# Patient Record
Sex: FEMALE | Race: WHITE | NOT HISPANIC OR LATINO | Employment: OTHER | URBAN - METROPOLITAN AREA
[De-identification: names, ages, dates, MRNs, and addresses within clinical notes are randomized per-mention and may not be internally consistent; named-entity substitution may affect disease eponyms.]

---

## 2018-12-17 ENCOUNTER — OFFICE VISIT (OUTPATIENT)
Dept: SPINE | Facility: CLINIC | Age: 68
End: 2018-12-17
Payer: MEDICARE

## 2018-12-17 ENCOUNTER — HOSPITAL ENCOUNTER (OUTPATIENT)
Dept: RADIOLOGY | Facility: HOSPITAL | Age: 68
Discharge: HOME | End: 2018-12-17
Attending: PHYSICIAN ASSISTANT
Payer: MEDICARE

## 2018-12-17 VITALS
HEART RATE: 64 BPM | DIASTOLIC BLOOD PRESSURE: 81 MMHG | BODY MASS INDEX: 22.37 KG/M2 | HEIGHT: 61 IN | WEIGHT: 118.5 LBS | SYSTOLIC BLOOD PRESSURE: 149 MMHG

## 2018-12-17 DIAGNOSIS — M54.50 CHRONIC BILATERAL LOW BACK PAIN WITHOUT SCIATICA: Primary | ICD-10-CM

## 2018-12-17 DIAGNOSIS — M54.50 LOW BACK PAIN, NON-SPECIFIC: ICD-10-CM

## 2018-12-17 DIAGNOSIS — G89.29 CHRONIC BILATERAL LOW BACK PAIN WITHOUT SCIATICA: Primary | ICD-10-CM

## 2018-12-17 PROCEDURE — 99999 PR PBB SHADOW E&M-EST. PATIENT-LVL III: CPT | Mod: PBBFAC,,, | Performed by: PHYSICIAN ASSISTANT

## 2018-12-17 PROCEDURE — 99213 OFFICE O/P EST LOW 20 MIN: CPT | Mod: PBBFAC,25,PN | Performed by: PHYSICIAN ASSISTANT

## 2018-12-17 PROCEDURE — 99203 OFFICE O/P NEW LOW 30 MIN: CPT | Mod: S$PBB,,, | Performed by: PHYSICIAN ASSISTANT

## 2018-12-17 PROCEDURE — 72110 X-RAY EXAM L-2 SPINE 4/>VWS: CPT | Mod: 26,,, | Performed by: RADIOLOGY

## 2018-12-17 PROCEDURE — 72110 X-RAY EXAM L-2 SPINE 4/>VWS: CPT | Mod: TC,PO

## 2018-12-17 RX ORDER — IBUPROFEN 200 MG
200 TABLET ORAL EVERY 12 HOURS
COMMUNITY
End: 2019-04-12

## 2018-12-17 NOTE — LETTER
December 17, 2018      Siobhan Fu MD  [id number] [facility] [address]  [address]  [address]  [facility] [address]           [place] [facility]  [address] [facility] [address]  [facility] [address]  Phone: [phone]  Fax: [phone]          Patient: Marian Hardy   MR Number: [id number]   YOB: 1950   Date of Visit: 12/17/2018       Dear Dr. Siobhan Fu:    Thank you for referring Marian Hardy to me for evaluation. Attached you will find relevant portions of my assessment and plan of care.    If you have questions, please do not hesitate to call me. I look forward to following Marian Hardy along with you.    Sincerely,    MARGE Nevesosure  CC:  No Recipients    If you would like to receive this communication electronically, please contact [email or web address] or [phone] to request more information on [facility] Link access.    For providers and/or their staff who would like to refer a patient to [facility], please contact us through our one-stop-shop provider referral line, [facility], at [phone].    If you feel you have received this communication in error or would no longer like to receive these types of communications, please e-mail [email or web address]

## 2018-12-17 NOTE — PROGRESS NOTES
Neurosurgery History & Physical    Patient ID: Marian Hardy is a 68 y.o. female.    Chief Complaint   Patient presents with    Low-back Pain     She has had low back pain since falling 11-14-18. Pain is constant. Lying down and Advil makes pain better. Sharp movements and twisting makes pain worse.       Review of Systems   Constitutional: Negative for activity change, appetite change, chills, fever and unexpected weight change.   HENT: Negative for tinnitus, trouble swallowing and voice change.    Respiratory: Negative for apnea, cough, chest tightness and shortness of breath.    Cardiovascular: Negative for chest pain and palpitations.   Gastrointestinal: Negative for constipation, diarrhea, nausea and vomiting.   Genitourinary: Negative for difficulty urinating, dysuria, frequency and urgency.   Musculoskeletal: Positive for back pain. Negative for gait problem, neck pain and neck stiffness.   Skin: Negative for wound.   Neurological: Negative for dizziness, tremors, seizures, facial asymmetry, speech difficulty, weakness, light-headedness, numbness and headaches.   Psychiatric/Behavioral: Negative for confusion and decreased concentration.       Past Medical History:   Diagnosis Date    Colon polyps     Hypertension     MVP (mitral valve prolapse)     Osteoporosis      Social History     Socioeconomic History    Marital status:      Spouse name: Not on file    Number of children: Not on file    Years of education: Not on file    Highest education level: Not on file   Social Needs    Financial resource strain: Not on file    Food insecurity - worry: Not on file    Food insecurity - inability: Not on file    Transportation needs - medical: Not on file    Transportation needs - non-medical: Not on file   Occupational History    Not on file   Tobacco Use    Smoking status: Never Smoker    Smokeless tobacco: Never Used   Substance and Sexual Activity    Alcohol use: Yes    Drug use:  "No    Sexual activity: Not on file   Other Topics Concern    Not on file   Social History Narrative    Not on file     Family History   Problem Relation Age of Onset    Heart disease Father      Review of patient's allergies indicates:   Allergen Reactions    Ace inhibitors      cough       Current Outpatient Medications:     fluticasone (FLONASE) 50 mcg/actuation nasal spray, 1 spray by Each Nare route once daily., Disp: , Rfl:     ibuprofen (ADVIL,MOTRIN) 200 MG tablet, Take 200 mg by mouth every 12 (twelve) hours., Disp: , Rfl:     losartan (COZAAR) 25 MG tablet, Take 1 tablet (25 mg total) by mouth once daily., Disp: 90 tablet, Rfl: 3    montelukast (SINGULAIR) 10 mg tablet, , Disp: , Rfl:     Vitals:    12/17/18 1326   BP: (!) 149/81   BP Location: Right arm   Patient Position: Sitting   BP Method: Medium (Automatic)   Pulse: 64   Weight: 53.8 kg (118 lb 8 oz)   Height: 5' 1" (1.549 m)       Physical Exam   Constitutional: She is oriented to person, place, and time. She appears well-developed and well-nourished.   HENT:   Head: Normocephalic and atraumatic.   Eyes: Pupils are equal, round, and reactive to light.   Neck: Normal range of motion. Neck supple.   Cardiovascular: Normal rate.   Pulmonary/Chest: Effort normal.   Musculoskeletal: Normal range of motion. She exhibits no edema.   Neurological: She is alert and oriented to person, place, and time. She has a normal Finger-Nose-Finger Test, a normal Heel to Shin Test, a normal Romberg Test and a normal Tandem Gait Test. Gait normal.   Reflex Scores:       Tricep reflexes are 2+ on the right side and 2+ on the left side.       Bicep reflexes are 2+ on the right side and 2+ on the left side.       Brachioradialis reflexes are 2+ on the right side and 2+ on the left side.       Patellar reflexes are 2+ on the right side and 2+ on the left side.       Achilles reflexes are 2+ on the right side and 2+ on the left side.  Skin: Skin is warm, dry and " intact.   Psychiatric: She has a normal mood and affect. Her speech is normal and behavior is normal. Judgment and thought content normal.   Nursing note and vitals reviewed.      Neurologic Exam     Mental Status   Oriented to person, place, and time.   Oriented to person.   Oriented to place.   Oriented to time.   Follows 3 step commands.   Attention: normal. Concentration: normal.   Speech: speech is normal   Level of consciousness: alert  Knowledge: consistent with education.   Able to name object. Able to read. Able to repeat. Able to write. Normal comprehension.     Cranial Nerves     CN II   Visual acuity: normal  Right visual field deficit: none  Left visual field deficit: none     CN III, IV, VI   Pupils are equal, round, and reactive to light.  Right pupil: Size: 3 mm. Shape: regular. Reactivity: brisk. Consensual response: intact.   Left pupil: Size: 3 mm. Shape: regular. Reactivity: brisk. Consensual response: intact.   CN III: no CN III palsy  CN VI: no CN VI palsy  Nystagmus: none   Diplopia: none  Ophthalmoparesis: none  Conjugate gaze: present    CN V   Right facial sensation deficit: none  Left facial sensation deficit: none    CN VII   Right facial weakness: none  Left facial weakness: none    CN VIII   Hearing: intact    CN IX, X   CN IX normal.   CN X normal.     CN XI   Right sternocleidomastoid strength: normal  Left sternocleidomastoid strength: normal  Right trapezius strength: normal  Left trapezius strength: normal    CN XII   Fasciculations: absent  Tongue deviation: none    Motor Exam   Muscle bulk: normal  Overall muscle tone: normal  Right arm pronator drift: absent  Left arm pronator drift: absent    Strength   Right neck flexion: 5/5  Left neck flexion: 5/5  Right neck extension: 5/5  Left neck extension: 5/5  Right deltoid: 5/5  Left deltoid: 5/5  Right biceps: 5/5  Left biceps: 5/5  Right triceps: 5/5  Left triceps: 5/5  Right wrist flexion: 5/5  Left wrist flexion: 5/5  Right wrist  extension: 5/5  Left wrist extension: 5/5  Right interossei: 5/5  Left interossei: 5/5  Right abdominals: 5/5  Left abdominals: 5/5  Right iliopsoas: 5/5  Left iliopsoas: 5/5  Right quadriceps: 5/5  Left quadriceps: 5/5  Right hamstrin/5  Left hamstrin/5  Right glutei: 5/5  Left glutei: 5/5  Right anterior tibial: 5/5  Left anterior tibial: 5/5  Right posterior tibial: 5/5  Left posterior tibial: 5/5  Right peroneal: 5/5  Left peroneal: 5/5  Right gastroc: 5/5  Left gastroc: 5/5    Sensory Exam   Right arm light touch: normal  Left arm light touch: normal  Right leg light touch: normal  Left leg light touch: normal  Right arm vibration: normal  Left arm vibration: normal  Right arm pinprick: normal  Left arm pinprick: normal    Gait, Coordination, and Reflexes     Gait  Gait: normal    Coordination   Romberg: negative  Finger to nose coordination: normal  Heel to shin coordination: normal  Tandem walking coordination: normal    Tremor   Resting tremor: absent  Intention tremor: absent  Action tremor: absent    Reflexes   Right brachioradialis: 2+  Left brachioradialis: 2+  Right biceps: 2+  Left biceps: 2+  Right triceps: 2+  Left triceps: 2+  Right patellar: 2+  Left patellar: 2+  Right achilles: 2+  Left achilles: 2+  Right Quiñones: absent  Left Quiñones: absent  Right ankle clonus: absent  Left ankle clonus: absent      Provider dictation:  68 year old female with hypertension is referred by Dr. Siobhan Fu for evaluation of back pain.  She fell 11-14-18 and developed pain across the lower back.  She had a few days onset of left leg radicular pain, but it has fully resolved at this time.  Bilateral lower back pain is slightly better and still present.  Pain increases with twisting and improves with laying down.  She has taken advil sparingly for pain.  She has not had PT or LIDA.  Oswestry score: 28%.  PHQ:  0.    She is neurologically intact on exam without any tenderness to percussion on exam.    She has  not had any imaging.    Ms. Hardy has acute onset lower back pain after a fall 1 month ago.  With no tenderness to percussion, suspicion for fracture is low, but will obtain xrays of the lumbar spine to rule out completely.    I suspect she has a myofascial injury.  We will call with xray results and further recommendations.    Visit Diagnosis:  Chronic bilateral low back pain without sciatica    Low back pain, non-specific  -     X-Ray Lumbar Complete With Flex And Ext; Future; Expected date: 12/17/2018        Total time spent counseling greater than fifty percent of total visit time.  Counseling included discussion regarding imaging findings, diagnosis possibilities, treatment options, risks and benefits.   The patient had many questions regarding the options and long-term effects.

## 2018-12-18 DIAGNOSIS — S32.040A CLOSED COMPRESSION FRACTURE OF FOURTH LUMBAR VERTEBRA, INITIAL ENCOUNTER: Primary | ICD-10-CM

## 2018-12-18 NOTE — PROGRESS NOTES
I have called and spoken with her.  She has an L4 compression fracture that I most likely new with her history of fall and tenderness on exam.  We have discussed bracing and kyphoplasty.  She would like to try bracing at this time.  Gave instructions on bracing and no lifing more than 10 pounds for the next 3 weeks.  If pain still persists in 3-4 wrrks, we will need to consider kyphoplasty.      I have orderd the brace and she will come by to pick it up.

## 2019-01-14 ENCOUNTER — PATIENT MESSAGE (OUTPATIENT)
Dept: SPINE | Facility: CLINIC | Age: 69
End: 2019-01-14

## 2019-01-14 ENCOUNTER — TELEPHONE (OUTPATIENT)
Dept: ADMINISTRATIVE | Facility: OTHER | Age: 69
End: 2019-01-14

## 2019-01-14 DIAGNOSIS — S32.040A CLOSED COMPRESSION FRACTURE OF FOURTH LUMBAR VERTEBRA, INITIAL ENCOUNTER: Primary | ICD-10-CM

## 2019-01-14 NOTE — TELEPHONE ENCOUNTER
----- Message from Junior Salas sent at 1/14/2019  4:07 PM CST -----  Contact: pt  Type:  Patient Returning Call    Who Called:  pt  Who Left Message for Patient:  Bijal  Does the patient know what this is regarding?:    Best Call Back Number:  [phone]  Additional Information:

## 2019-01-14 NOTE — TELEPHONE ENCOUNTER
Since she is still having pain, I recommend an MRI of the lumbar spine and have her follow up in clinic with me to discuss further recommendations.  MRI should be soonest available/ possible.

## 2019-01-15 NOTE — TELEPHONE ENCOUNTER
Spoke with patient and scheduled her for an MRI on 1-23-19 and a return appointment to see Sandra Mathews for results 1-25-19, she indicated understanding.

## 2019-01-23 ENCOUNTER — HOSPITAL ENCOUNTER (OUTPATIENT)
Dept: RADIOLOGY | Facility: HOSPITAL | Age: 69
Discharge: HOME | End: 2019-01-23
Attending: PHYSICIAN ASSISTANT
Payer: MEDICARE

## 2019-01-23 DIAGNOSIS — S32.040A CLOSED COMPRESSION FRACTURE OF FOURTH LUMBAR VERTEBRA, INITIAL ENCOUNTER: ICD-10-CM

## 2019-01-23 PROCEDURE — 72148 MRI LUMBAR SPINE W/O DYE: CPT | Mod: 26,,, | Performed by: RADIOLOGY

## 2019-01-23 PROCEDURE — 72148 MRI LUMBAR SPINE W/O DYE: CPT | Mod: TC,PO

## 2019-01-23 PROCEDURE — 72148 MRI LUMBAR SPINE WITHOUT CONTRAST: ICD-10-PCS | Mod: 26,,, | Performed by: RADIOLOGY

## 2019-01-25 ENCOUNTER — OFFICE VISIT (OUTPATIENT)
Dept: SPINE | Facility: CLINIC | Age: 69
End: 2019-01-25
Payer: MEDICARE

## 2019-01-25 ENCOUNTER — PATIENT MESSAGE (OUTPATIENT)
Dept: SPINE | Facility: CLINIC | Age: 69
End: 2019-01-25

## 2019-01-25 VITALS
HEART RATE: 76 BPM | HEIGHT: 61 IN | DIASTOLIC BLOOD PRESSURE: 75 MMHG | SYSTOLIC BLOOD PRESSURE: 127 MMHG | WEIGHT: 118.63 LBS | BODY MASS INDEX: 22.4 KG/M2

## 2019-01-25 DIAGNOSIS — S32.040A CLOSED COMPRESSION FRACTURE OF FOURTH LUMBAR VERTEBRA, INITIAL ENCOUNTER: Primary | ICD-10-CM

## 2019-01-25 DIAGNOSIS — M54.50 LOW BACK PAIN, NON-SPECIFIC: ICD-10-CM

## 2019-01-25 PROCEDURE — 99999 PR PBB SHADOW E&M-EST. PATIENT-LVL III: CPT | Mod: PBBFAC,,, | Performed by: PHYSICIAN ASSISTANT

## 2019-01-25 PROCEDURE — 99214 OFFICE O/P EST MOD 30 MIN: CPT | Mod: S$PBB,,, | Performed by: PHYSICIAN ASSISTANT

## 2019-01-25 PROCEDURE — 99213 OFFICE O/P EST LOW 20 MIN: CPT | Mod: PBBFAC,PN | Performed by: PHYSICIAN ASSISTANT

## 2019-01-25 PROCEDURE — 99999 PR PBB SHADOW E&M-EST. PATIENT-LVL III: ICD-10-PCS | Mod: PBBFAC,,, | Performed by: PHYSICIAN ASSISTANT

## 2019-01-25 PROCEDURE — 99214 PR OFFICE/OUTPT VISIT, EST, LEVL IV, 30-39 MIN: ICD-10-PCS | Mod: S$PBB,,, | Performed by: PHYSICIAN ASSISTANT

## 2019-01-25 RX ORDER — METHYLPREDNISOLONE 4 MG/1
TABLET ORAL
COMMUNITY
Start: 2019-01-21 | End: 2019-02-14

## 2019-01-25 NOTE — PROGRESS NOTES
Neurosurgery History & Physical    Patient ID: Marian Hardy is a 68 y.o. female.    Chief Complaint   Patient presents with    Follow-up     MRI results       Review of Systems   Constitutional: Negative for activity change, appetite change, chills, fever and unexpected weight change.   HENT: Negative for tinnitus, trouble swallowing and voice change.    Respiratory: Negative for apnea, cough, chest tightness and shortness of breath.    Cardiovascular: Negative for chest pain and palpitations.   Gastrointestinal: Negative for constipation, diarrhea, nausea and vomiting.   Genitourinary: Negative for difficulty urinating, dysuria, frequency and urgency.   Musculoskeletal: Positive for back pain. Negative for gait problem, neck pain and neck stiffness.   Skin: Negative for wound.   Neurological: Negative for dizziness, tremors, seizures, facial asymmetry, speech difficulty, weakness, light-headedness, numbness and headaches.   Psychiatric/Behavioral: Negative for confusion and decreased concentration.       Past Medical History:   Diagnosis Date    Colon polyps     Hypertension     MVP (mitral valve prolapse)     Osteoporosis      Social History     Socioeconomic History    Marital status:      Spouse name: Not on file    Number of children: Not on file    Years of education: Not on file    Highest education level: Not on file   Social Needs    Financial resource strain: Not on file    Food insecurity - worry: Not on file    Food insecurity - inability: Not on file    Transportation needs - medical: Not on file    Transportation needs - non-medical: Not on file   Occupational History    Not on file   Tobacco Use    Smoking status: Never Smoker    Smokeless tobacco: Never Used   Substance and Sexual Activity    Alcohol use: Yes    Drug use: No    Sexual activity: Not on file   Other Topics Concern    Not on file   Social History Narrative    Not on file     Family History   Problem  "Relation Age of Onset    Heart disease Father      Review of patient's allergies indicates:   Allergen Reactions    Ace inhibitors      cough       Current Outpatient Medications:     fluticasone (FLONASE) 50 mcg/actuation nasal spray, 1 spray by Each Nare route once daily., Disp: , Rfl:     ibuprofen (ADVIL,MOTRIN) 200 MG tablet, Take 200 mg by mouth every 12 (twelve) hours., Disp: , Rfl:     losartan (COZAAR) 25 MG tablet, Take 1 tablet (25 mg total) by mouth once daily., Disp: 90 tablet, Rfl: 3    methylPREDNISolone (MEDROL DOSEPACK) 4 mg tablet, , Disp: , Rfl:     montelukast (SINGULAIR) 10 mg tablet, , Disp: , Rfl:     Vitals:    01/25/19 0903   BP: 127/75   BP Location: Left arm   Patient Position: Sitting   BP Method: Medium (Automatic)   Pulse: 76   Weight: 53.8 kg (118 lb 9.7 oz)   Height: 5' 1" (1.549 m)       Physical Exam   Constitutional: She is oriented to person, place, and time. She appears well-developed and well-nourished.   HENT:   Head: Normocephalic and atraumatic.   Eyes: Pupils are equal, round, and reactive to light.   Neck: Normal range of motion. Neck supple.   Cardiovascular: Normal rate.   Pulmonary/Chest: Effort normal.   Musculoskeletal: Normal range of motion. She exhibits no edema.   Neurological: She is alert and oriented to person, place, and time. She has a normal Finger-Nose-Finger Test, a normal Heel to Shin Test, a normal Romberg Test and a normal Tandem Gait Test. Gait normal.   Reflex Scores:       Tricep reflexes are 2+ on the right side and 2+ on the left side.       Bicep reflexes are 2+ on the right side and 2+ on the left side.       Brachioradialis reflexes are 2+ on the right side and 2+ on the left side.       Patellar reflexes are 2+ on the right side and 2+ on the left side.       Achilles reflexes are 2+ on the right side and 2+ on the left side.  Skin: Skin is warm, dry and intact.   Psychiatric: She has a normal mood and affect. Her speech is normal and " behavior is normal. Judgment and thought content normal.   Nursing note and vitals reviewed.      Neurologic Exam     Mental Status   Oriented to person, place, and time.   Oriented to person.   Oriented to place.   Oriented to time.   Follows 3 step commands.   Attention: normal. Concentration: normal.   Speech: speech is normal   Level of consciousness: alert  Knowledge: consistent with education.   Able to name object. Able to read. Able to repeat. Able to write. Normal comprehension.     Cranial Nerves     CN II   Visual acuity: normal  Right visual field deficit: none  Left visual field deficit: none     CN III, IV, VI   Pupils are equal, round, and reactive to light.  Right pupil: Size: 3 mm. Shape: regular. Reactivity: brisk. Consensual response: intact.   Left pupil: Size: 3 mm. Shape: regular. Reactivity: brisk. Consensual response: intact.   CN III: no CN III palsy  CN VI: no CN VI palsy  Nystagmus: none   Diplopia: none  Ophthalmoparesis: none  Conjugate gaze: present    CN V   Right facial sensation deficit: none  Left facial sensation deficit: none    CN VII   Right facial weakness: none  Left facial weakness: none    CN VIII   Hearing: intact    CN IX, X   CN IX normal.   CN X normal.     CN XI   Right sternocleidomastoid strength: normal  Left sternocleidomastoid strength: normal  Right trapezius strength: normal  Left trapezius strength: normal    CN XII   Fasciculations: absent  Tongue deviation: none    Motor Exam   Muscle bulk: normal  Overall muscle tone: normal  Right arm pronator drift: absent  Left arm pronator drift: absent    Strength   Right neck flexion: 5/5  Left neck flexion: 5/5  Right neck extension: 5/5  Left neck extension: 5/5  Right deltoid: 5/5  Left deltoid: 5/5  Right biceps: 5/5  Left biceps: 5/5  Right triceps: 5/5  Left triceps: 5/5  Right wrist flexion: 5/5  Left wrist flexion: 5/5  Right wrist extension: 5/5  Left wrist extension: 5/5  Right interossei: 5/5  Left  interossei: 5/5  Right abdominals: 5/5  Left abdominals: 5/5  Right iliopsoas: 5/5  Left iliopsoas: 5/5  Right quadriceps: 5/5  Left quadriceps: 5/5  Right hamstrin/5  Left hamstrin/5  Right glutei: 5/5  Left glutei: 5/5  Right anterior tibial: 5/5  Left anterior tibial: 5/5  Right posterior tibial: 5/5  Left posterior tibial: 5/5  Right peroneal: 5/5  Left peroneal: 5/5  Right gastroc: 5/5  Left gastroc: 5/5    Sensory Exam   Right arm light touch: normal  Left arm light touch: normal  Right leg light touch: normal  Left leg light touch: normal  Right arm vibration: normal  Left arm vibration: normal  Right arm pinprick: normal  Left arm pinprick: normal    Gait, Coordination, and Reflexes     Gait  Gait: normal    Coordination   Romberg: negative  Finger to nose coordination: normal  Heel to shin coordination: normal  Tandem walking coordination: normal    Tremor   Resting tremor: absent  Intention tremor: absent  Action tremor: absent    Reflexes   Right brachioradialis: 2+  Left brachioradialis: 2+  Right biceps: 2+  Left biceps: 2+  Right triceps: 2+  Left triceps: 2+  Right patellar: 2+  Left patellar: 2+  Right achilles: 2+  Left achilles: 2+  Right Quiñones: absent  Left Quiñones: absent  Right ankle clonus: absent  Left ankle clonus: absent      Provider dictation:  68 year old female with hypertension presents for follow up of back pain and L4 compression fracture.  She fell 18 and developed pain across the lower back.  She had a few days onset of left leg radicular pain, but it has fully resolved at this time.  She was last seen in clinic 18 at which time the L4 compression fracture was noted.  we subsequently treated her with bracing.  She admits to wearing it initially, but stopped wearing it due to discomfort.  She contacted up 19 with continued pain at which time an MRI was ordered for further assessment.  Over the last 1-2 weeks, pain in the lower back has significantly  improved.  She rates pain as 2-3 /10.  She has no radicular leg pain.  She has taken advil sparingly for pain.  She has not had PT or LIDA.  Oswestry score: 28%.  PHQ:  0.    She is neurologically intact on exam without any tenderness to percussion on exam.    I have reviewed xrays (12-17-18) and an MRI (1-14-19) of the lumbar spine demonstrating an acute/ subacute L4 anterior wedge compression deformity.  There is an L3/4 left foraminal disk hernia with mild bilateral facet effusions.  L4/5 mild bilateral facet effusions are seen as well.    Ms. Hardy has improving lower back pain from acute healing L4 compression deformity.  There is no retropulsion.  Lower back pain is improving and should continue to improve/ resolve in the next 1-2 weeks.  If it does not, kyphoplasty is an option.  She should continue with activity as tolerated and no lifting greater than 10 pounds for the next 2 weeks.  Bracing is still recommended until pain resolves.  Resolved left leg pain is from L3/4 left disk hernia - no further treatment needed for this region as pain resolfed.    Visit Diagnosis:  Closed compression fracture of fourth lumbar vertebra, initial encounter    Low back pain, non-specific        Total time spent counseling greater than fifty percent of total visit time.  Counseling included discussion regarding imaging findings, diagnosis possibilities, treatment options, risks and benefits.   The patient had many questions regarding the options and long-term effects.

## 2020-12-21 ENCOUNTER — PATIENT MESSAGE (OUTPATIENT)
Dept: OTHER | Facility: OTHER | Age: 70
End: 2020-12-21

## 2021-06-25 PROBLEM — J45.909 EXTRINSIC ASTHMA WITHOUT COMPLICATION: Status: ACTIVE | Noted: 2021-06-25

## 2021-09-03 ENCOUNTER — PATIENT MESSAGE (OUTPATIENT)
Dept: OTOLARYNGOLOGY | Facility: CLINIC | Age: 71
End: 2021-09-03

## 2021-09-03 ENCOUNTER — TELEPHONE (OUTPATIENT)
Dept: OTOLARYNGOLOGY | Facility: CLINIC | Age: 71
End: 2021-09-03

## 2021-09-23 ENCOUNTER — OFFICE VISIT (OUTPATIENT)
Dept: OTOLARYNGOLOGY | Facility: CLINIC | Age: 71
End: 2021-09-23
Payer: MEDICARE

## 2021-09-23 VITALS — HEART RATE: 69 BPM | SYSTOLIC BLOOD PRESSURE: 139 MMHG | DIASTOLIC BLOOD PRESSURE: 89 MMHG

## 2021-09-23 DIAGNOSIS — H61.23 BILATERAL IMPACTED CERUMEN: Primary | ICD-10-CM

## 2021-09-23 PROCEDURE — 69210 REMOVE IMPACTED EAR WAX UNI: CPT | Mod: 50,PBBFAC | Performed by: NURSE PRACTITIONER

## 2021-09-23 PROCEDURE — 99999 PR PBB SHADOW E&M-EST. PATIENT-LVL III: CPT | Mod: PBBFAC,,, | Performed by: NURSE PRACTITIONER

## 2021-09-23 PROCEDURE — 99999 PR PBB SHADOW E&M-EST. PATIENT-LVL III: ICD-10-PCS | Mod: PBBFAC,,, | Performed by: NURSE PRACTITIONER

## 2021-09-23 PROCEDURE — 99499 NO LOS: ICD-10-PCS | Mod: S$PBB,,, | Performed by: NURSE PRACTITIONER

## 2021-09-23 PROCEDURE — 69210 EAR CERUMEN REMOVAL: ICD-10-PCS | Mod: S$PBB,,, | Performed by: NURSE PRACTITIONER

## 2021-09-23 PROCEDURE — 99499 UNLISTED E&M SERVICE: CPT | Mod: S$PBB,,, | Performed by: NURSE PRACTITIONER

## 2021-09-23 PROCEDURE — 99213 OFFICE O/P EST LOW 20 MIN: CPT | Mod: PBBFAC | Performed by: NURSE PRACTITIONER

## 2021-09-23 PROCEDURE — 69210 REMOVE IMPACTED EAR WAX UNI: CPT | Mod: S$PBB,,, | Performed by: NURSE PRACTITIONER

## 2022-07-01 ENCOUNTER — TELEPHONE (OUTPATIENT)
Dept: INTERNAL MEDICINE | Facility: CLINIC | Age: 72
End: 2022-07-01
Payer: MEDICARE

## 2022-08-29 PROBLEM — E55.9 VITAMIN D DEFICIENCY: Status: ACTIVE | Noted: 2022-08-29

## 2022-10-07 ENCOUNTER — OFFICE VISIT (OUTPATIENT)
Dept: INTERNAL MEDICINE | Facility: CLINIC | Age: 72
End: 2022-10-07
Payer: MEDICARE

## 2022-10-07 VITALS
DIASTOLIC BLOOD PRESSURE: 84 MMHG | HEART RATE: 67 BPM | BODY MASS INDEX: 20.39 KG/M2 | HEIGHT: 61 IN | OXYGEN SATURATION: 97 % | SYSTOLIC BLOOD PRESSURE: 122 MMHG | WEIGHT: 108 LBS

## 2022-10-07 DIAGNOSIS — E55.9 VITAMIN D DEFICIENCY: ICD-10-CM

## 2022-10-07 DIAGNOSIS — Z23 NEED FOR VACCINATION: ICD-10-CM

## 2022-10-07 DIAGNOSIS — Z00.00 HEALTH MAINTENANCE EXAMINATION: Primary | ICD-10-CM

## 2022-10-07 DIAGNOSIS — Z91.09 ENVIRONMENTAL ALLERGIES: ICD-10-CM

## 2022-10-07 DIAGNOSIS — M85.80 OSTEOPENIA, UNSPECIFIED LOCATION: ICD-10-CM

## 2022-10-07 DIAGNOSIS — Z91.89 FRAMINGHAM CARDIAC RISK 10-20% IN NEXT 10 YEARS: ICD-10-CM

## 2022-10-07 DIAGNOSIS — I10 ESSENTIAL HYPERTENSION: ICD-10-CM

## 2022-10-07 PROCEDURE — 99999 PR PBB SHADOW E&M-EST. PATIENT-LVL IV: ICD-10-PCS | Mod: PBBFAC,,, | Performed by: STUDENT IN AN ORGANIZED HEALTH CARE EDUCATION/TRAINING PROGRAM

## 2022-10-07 PROCEDURE — 99204 PR OFFICE/OUTPT VISIT, NEW, LEVL IV, 45-59 MIN: ICD-10-PCS | Mod: S$PBB,,, | Performed by: STUDENT IN AN ORGANIZED HEALTH CARE EDUCATION/TRAINING PROGRAM

## 2022-10-07 PROCEDURE — 99204 OFFICE O/P NEW MOD 45 MIN: CPT | Mod: S$PBB,,, | Performed by: STUDENT IN AN ORGANIZED HEALTH CARE EDUCATION/TRAINING PROGRAM

## 2022-10-07 PROCEDURE — 99999 PR PBB SHADOW E&M-EST. PATIENT-LVL IV: CPT | Mod: PBBFAC,,, | Performed by: STUDENT IN AN ORGANIZED HEALTH CARE EDUCATION/TRAINING PROGRAM

## 2022-10-07 PROCEDURE — 99214 OFFICE O/P EST MOD 30 MIN: CPT | Mod: PBBFAC | Performed by: STUDENT IN AN ORGANIZED HEALTH CARE EDUCATION/TRAINING PROGRAM

## 2022-10-07 RX ORDER — LOSARTAN POTASSIUM 25 MG/1
25 TABLET ORAL DAILY
Qty: 90 TABLET | Refills: 3
Start: 2022-10-07 | End: 2022-10-11 | Stop reason: SDUPTHER

## 2022-10-07 RX ORDER — CALCIUM CARBONATE 600 MG
600 TABLET ORAL DAILY
COMMUNITY

## 2022-10-07 RX ORDER — MONTELUKAST SODIUM 10 MG/1
10 TABLET ORAL NIGHTLY
Qty: 90 TABLET | Refills: 3
Start: 2022-10-07 | End: 2022-10-11 | Stop reason: SDUPTHER

## 2022-10-07 NOTE — PATIENT INSTRUCTIONS
I recommend starting a daily vitamin D3 (cholecalciferol) 2,000-3,000 IU supplement. This can be obtained without a prescription at most pharmacies or grocery stores.     [email or web address]/[email or web address]

## 2022-10-07 NOTE — PROGRESS NOTES
Subjective:       Patient ID: Marian Hardy is a 72 y.o. female.    Chief Complaint: Health maintenance examination [Z00.00]    Patient is new to me, here to establish care.    Health maintenance -   Cologuard SEP2022 negative, repeat in 3 years.  Denies family history of colorectal cancer.   Mammogram BI-RADS 1 in AUG2022.  Denies history of prior abnormal mammogram.  Family history of breast cancers.  Denies family history of ovarian cancers.   Denies history of prior abnormal pap smears.  Significant family history of cardiac disease.  UTD on COVID19 primary/booster/4th, shingles, PCV13, and PPSV23 vaccinations.  Due for influenza, Tdap vaccinations.  Started at age 12, at most 1 PPD. Quit at age 22.  Drinks alcohol 5-6 times weekly, 1-2 drinks per sitting.  Denies drug use.  Completed Hep C screening.  UTD on diabetes screening.    HTN -   Currently prescribed losartan.  Patient endorses taking medication as directed.  Denies side effects or concerns while taking medication.  Patient not routinely checking BP at home.  Denies headaches, vision changes, CP, palpitations, or other concerning symptoms.  Due for micro albumin creatinine ratio.   BP Readings from Last 3 Encounters:  08/29/22 : 118/62  09/23/21 : 139/89  06/25/21 : 124/80    Elevated [place] risk -  Lab Results       Component                Value               Date                       LDLCALC                  110                 09/12/2022            The 10-year ASCVD risk score (Pedro Pablo HILL, et al., 2019) is: 16.3%    Osteopenia -   Vitamin D deficiency -  DEXA in 2020 with osteopenia   Previously on bisphosphonate therapy   Family history of osteoporosis.  Not currently taking vitamin D supplementation   SEP2022 Vitamin D 35      Taking Singulair for allergies with good effect        Review of Systems   Constitutional:  Negative for appetite change, chills, fatigue, fever and unexpected weight change.   Respiratory:  Negative for cough and  "shortness of breath.    Cardiovascular:  Negative for chest pain, palpitations and leg swelling.   Gastrointestinal:  Negative for abdominal pain, constipation, diarrhea, nausea and vomiting.   Skin:  Negative for rash.   Neurological:  Negative for dizziness, syncope, weakness and headaches.       Current Outpatient Medications   Medication Instructions    calcium carbonate (OS-DA) 600 mg, Oral, Daily    fluticasone propionate (FLONASE) 50 mcg, Each Nostril, Daily    losartan (COZAAR) 25 mg, Oral, Daily    montelukast (SINGULAIR) 10 mg, Oral, Nightly     Objective:      Vitals:    10/07/22 0920   BP: 122/84   Pulse: 67   SpO2: 97%   Weight: 49 kg (108 lb 0.4 oz)   Height: 5' 1" (1.549 m)   PainSc: 0-No pain     Body mass index is 20.41 kg/m².    Physical Exam  Vitals reviewed.   Constitutional:       General: She is not in acute distress.     Appearance: Normal appearance. She is not ill-appearing or diaphoretic.   HENT:      Head: Normocephalic and atraumatic.      Right Ear: Tympanic membrane, ear canal and external ear normal. There is no impacted cerumen.      Left Ear: Tympanic membrane, ear canal and external ear normal. There is no impacted cerumen.      Nose: Nose normal. No rhinorrhea.      Mouth/Throat:      Mouth: Mucous membranes are moist.      Pharynx: Oropharynx is clear. No oropharyngeal exudate or posterior oropharyngeal erythema.   Eyes:      General: No scleral icterus.        Right eye: No discharge.         Left eye: No discharge.      Conjunctiva/sclera: Conjunctivae normal.   Neck:      Thyroid: No thyromegaly or thyroid tenderness.      Trachea: Trachea normal.   Cardiovascular:      Rate and Rhythm: Normal rate and regular rhythm.      Heart sounds: Normal heart sounds. No murmur heard.    No friction rub. No gallop.   Pulmonary:      Effort: Pulmonary effort is normal. No respiratory distress.      Breath sounds: Normal breath sounds. No stridor. No wheezing, rhonchi or rales. "   Abdominal:      General: Bowel sounds are normal. There is no distension.      Palpations: Abdomen is soft.      Tenderness: There is no abdominal tenderness. There is no guarding or rebound.   Musculoskeletal:         General: No swelling or deformity.      Cervical back: Neck supple.   Lymphadenopathy:      Head:      Right side of head: No submandibular or posterior auricular adenopathy.      Left side of head: No submandibular or posterior auricular adenopathy.      Cervical: No cervical adenopathy.      Right cervical: No superficial, deep or posterior cervical adenopathy.     Left cervical: No superficial, deep or posterior cervical adenopathy.      Upper Body:      Right upper body: No supraclavicular adenopathy.      Left upper body: No supraclavicular adenopathy.   Skin:     General: Skin is warm and dry.   Neurological:      General: No focal deficit present.      Mental Status: She is alert. Mental status is at baseline.      Gait: Gait normal.   Psychiatric:         Mood and Affect: Mood normal.         Behavior: Behavior normal.       Assessment:       1. Health maintenance examination    2. Essential hypertension    3. [place] cardiac risk 10-20% in next 10 years    4. Osteopenia, unspecified location    5. Vitamin D deficiency    6. Environmental allergies    7. Need for vaccination        Plan:       Essential hypertension  Continue current medications  Declines micro-albumin creatinine screening.   RTC in 6 months for follow up  -     losartan (COZAAR) 25 MG tablet; Take 1 tablet (25 mg total) by mouth once daily.    [place] cardiac risk 10-20% in next 10 years  Declines statin therapy     Osteopenia, unspecified location  Vitamin D deficiency  Recommend vitamin D supplement  Declines repeat DEXA at this time, readdress at follow up    Environmental allergies  -     montelukast (SINGULAIR) 10 mg tablet; Take 1 tablet (10 mg total) by mouth every evening.    Health maintenance  examination  Need for vaccination  Discussed vaccinations, declines today      Shantal Beatty MD  10/7/2022

## 2022-10-10 ENCOUNTER — TELEPHONE (OUTPATIENT)
Dept: INTERNAL MEDICINE | Facility: CLINIC | Age: 72
End: 2022-10-10
Payer: MEDICARE

## 2022-10-10 NOTE — TELEPHONE ENCOUNTER
----- Message from Jolene Perdomo sent at 10/10/2022  9:16 AM CDT -----  Name of Who is Calling:ROBERT AVILEZ [[id number]]              What is the request in detail:Requesting a call back to get medication sent to pharmacy.               Can the clinic reply by [facility]:              What Number to Call Back if not in [facility]:[phone]

## 2022-10-11 DIAGNOSIS — I10 ESSENTIAL HYPERTENSION: ICD-10-CM

## 2022-10-11 DIAGNOSIS — Z91.09 ENVIRONMENTAL ALLERGIES: ICD-10-CM

## 2022-10-11 RX ORDER — LOSARTAN POTASSIUM 25 MG/1
25 TABLET ORAL DAILY
Qty: 90 TABLET | Refills: 1 | Status: SHIPPED | OUTPATIENT
Start: 2022-10-11 | End: 2023-03-22 | Stop reason: SDUPTHER

## 2022-10-11 RX ORDER — MONTELUKAST SODIUM 10 MG/1
10 TABLET ORAL NIGHTLY
Qty: 90 TABLET | Refills: 3 | Status: SHIPPED | OUTPATIENT
Start: 2022-10-11 | End: 2023-03-22 | Stop reason: SDUPTHER

## 2022-10-13 ENCOUNTER — PATIENT MESSAGE (OUTPATIENT)
Dept: INTERNAL MEDICINE | Facility: CLINIC | Age: 72
End: 2022-10-13
Payer: MEDICARE

## 2022-10-13 DIAGNOSIS — M85.80 OSTEOPENIA, UNSPECIFIED LOCATION: Primary | ICD-10-CM

## 2022-10-13 DIAGNOSIS — Z78.0 ASYMPTOMATIC MENOPAUSAL STATE: ICD-10-CM

## 2022-10-14 ENCOUNTER — HOSPITAL ENCOUNTER (OUTPATIENT)
Dept: RADIOLOGY | Facility: OTHER | Age: 72
Discharge: HOME | End: 2022-10-14
Attending: STUDENT IN AN ORGANIZED HEALTH CARE EDUCATION/TRAINING PROGRAM
Payer: MEDICARE

## 2022-10-14 DIAGNOSIS — Z78.0 ASYMPTOMATIC MENOPAUSAL STATE: ICD-10-CM

## 2022-10-14 DIAGNOSIS — M85.80 OSTEOPENIA, UNSPECIFIED LOCATION: ICD-10-CM

## 2022-10-14 PROCEDURE — 77080 DEXA BONE DENSITY SPINE HIP: ICD-10-PCS | Mod: 26,,, | Performed by: RADIOLOGY

## 2022-10-14 PROCEDURE — 77080 DXA BONE DENSITY AXIAL: CPT | Mod: TC

## 2022-10-14 PROCEDURE — 77080 DXA BONE DENSITY AXIAL: CPT | Mod: 26,,, | Performed by: RADIOLOGY

## 2022-10-26 ENCOUNTER — OFFICE VISIT (OUTPATIENT)
Dept: OTOLARYNGOLOGY | Facility: CLINIC | Age: 72
End: 2022-10-26
Payer: MEDICARE

## 2022-10-26 ENCOUNTER — CLINICAL SUPPORT (OUTPATIENT)
Dept: AUDIOLOGY | Facility: CLINIC | Age: 72
End: 2022-10-26
Payer: MEDICARE

## 2022-10-26 VITALS — DIASTOLIC BLOOD PRESSURE: 83 MMHG | HEART RATE: 62 BPM | SYSTOLIC BLOOD PRESSURE: 157 MMHG

## 2022-10-26 DIAGNOSIS — H61.22 IMPACTED CERUMEN OF LEFT EAR: Primary | ICD-10-CM

## 2022-10-26 DIAGNOSIS — H93.299 ABNORMAL AUDITORY PERCEPTION, UNSPECIFIED LATERALITY: Primary | ICD-10-CM

## 2022-10-26 DIAGNOSIS — Z01.10 NORMAL HEARING EXAM: ICD-10-CM

## 2022-10-26 PROCEDURE — 99212 OFFICE O/P EST SF 10 MIN: CPT | Mod: PBBFAC,25 | Performed by: NURSE PRACTITIONER

## 2022-10-26 PROCEDURE — 69210 EAR CERUMEN REMOVAL: ICD-10-PCS | Mod: S$PBB,,, | Performed by: NURSE PRACTITIONER

## 2022-10-26 PROCEDURE — 69210 REMOVE IMPACTED EAR WAX UNI: CPT | Mod: PBBFAC | Performed by: NURSE PRACTITIONER

## 2022-10-26 PROCEDURE — 99213 PR OFFICE/OUTPT VISIT, EST, LEVL III, 20-29 MIN: ICD-10-PCS | Mod: 25,S$PBB,, | Performed by: NURSE PRACTITIONER

## 2022-10-26 PROCEDURE — 92567 TYMPANOMETRY: CPT | Mod: PBBFAC | Performed by: AUDIOLOGIST

## 2022-10-26 PROCEDURE — 92557 COMPREHENSIVE HEARING TEST: CPT | Mod: PBBFAC | Performed by: AUDIOLOGIST

## 2022-10-26 PROCEDURE — 99999 PR PBB SHADOW E&M-EST. PATIENT-LVL II: ICD-10-PCS | Mod: PBBFAC,,, | Performed by: NURSE PRACTITIONER

## 2022-10-26 PROCEDURE — 99213 OFFICE O/P EST LOW 20 MIN: CPT | Mod: 25,S$PBB,, | Performed by: NURSE PRACTITIONER

## 2022-10-26 PROCEDURE — 99999 PR PBB SHADOW E&M-EST. PATIENT-LVL II: CPT | Mod: PBBFAC,,, | Performed by: NURSE PRACTITIONER

## 2022-10-26 PROCEDURE — 69210 REMOVE IMPACTED EAR WAX UNI: CPT | Mod: S$PBB,,, | Performed by: NURSE PRACTITIONER

## 2022-10-26 NOTE — PROGRESS NOTES
Audiologic Evaluation 10/26/2022:       Marian Hardy, a 72 y.o. female, was seen today in the clinic for a baseline audiologic evaluation.  Ms. Hardy was seen following cerumen removal by Yazmin Doll DNP. Ms. Hardy denied otalgia, tinnitus, aural fulness, and vertigo.     Tympanometry revealed Type A tympanogram in the right ear and Type A tympanogram in the left ear. Audiogram results revealed normal hearing sensitivity in the right ear and essentially normal hearing sensitivity in the left ear.  Speech reception thresholds were noted at 15 dB in the right ear and 20 dB in the left ear.  Speech discrimination scores were 100% in the right ear and 100% in the left ear.    Recommendations:  Otologic evaluation  Annual audiogram  Hearing protection when in noise

## 2022-10-26 NOTE — PROCEDURES
Ear Cerumen Removal    Date/Time: 10/26/2022 9:00 AM  Performed by: Yazmin Doll NP  Authorized by: Yazmin Doll NP       Local anesthetic:  None  Location details:  Left ear  Procedure type: curette    Cerumen  Removal Results:  Cerumen completely removed  Patient tolerance:  Patient tolerated the procedure well with no immediate complications     Procedure Note:    The patient was brought to the minor procedure room and placed under the operating microscope of the left ear canal which was cleaned of ceruminous debris. Using a combination of suction, curettes and cup forceps the patient's cerumen impaction was removed. The patient tolerated the procedure well. There were no complications.

## 2022-10-26 NOTE — PROCEDURES
Ear Cerumen Removal    Date/Time: 10/26/2022 9:00 AM  Performed by: Yazmin Doll NP  Authorized by: Yazmin Doll NP       Local anesthetic:  None  Location details:  Right ear  Procedure type: curette    Cerumen  Removal Results:  Cerumen completely removed  Patient tolerance:  Patient tolerated the procedure well with no immediate complications     Procedure Note:    The patient was brought to the minor procedure room and placed under the operating microscope of the right ear canal which was cleaned of ceruminous debris. Using a combination of suction, curettes and cup forceps the patient's cerumen impaction was removed. The patient tolerated the procedure well. There were no complications.

## 2022-10-26 NOTE — PROGRESS NOTES
Subjective:   Marian Hardy is a 72 y.o. female who was self-referred for cerumen impaction.    Marian Hardy presents to clinic for the evaluation of a cerumen impaction.  She reports the left ear has felt slightly clogged recently and wanted to have her ear cleaned.  Her last cleaning was 1 year ago.  She denies any otalgia, otorrhea, ear fullness/pressure, tinnitus or vertigo.  She would like to get a hearing test today to assess her hearing.  She has not had one recently.     Past Medical History  She has a past medical history of Colon polyps, Hypertension, MVP (mitral valve prolapse), and Osteoporosis.    Past Surgical History  She has a past surgical history that includes  section;  section;  section; and [place] tooth extraction.    Family History  Her family history includes Alzheimer's disease in her brother; Brain cancer in her maternal grandfather; Breast cancer (age of onset: 40) in her maternal aunt; Heart failure in her brother and father; Hypertension in her mother; Osteoporosis in her mother; Stroke in her maternal aunt.    Social History  She reports that she has never smoked. She has never used smokeless tobacco. She reports current alcohol use. She reports that she does not use drugs.    Allergies  She is allergic to ace inhibitors.    Medications  She has a current medication list which includes the following prescription(s): calcium carbonate, fluticasone propionate, losartan, and montelukast.  Review of Systems     Constitutional: Negative for appetite change, chills, fatigue, fever and unexpected weight loss.      HENT: Negative for ear discharge, ear infection, ear pain, facial swelling, hearing loss, mouth sores, nosebleeds, postnasal drip, ringing in the ears, runny nose, sinus infection, sinus pressure, sore throat, stuffy nose, tonsil infection, dental problems, trouble swallowing and voice change.      Eyes:  Negative for change in eyesight, eye  drainage, eye itching and photophobia.     Respiratory:  Positive for wheezing.      Cardiovascular:  Negative for chest pain, foot swelling, irregular heartbeat and swollen veins.     Gastrointestinal:  Negative for abdominal pain, acid reflux, constipation, diarrhea, heartburn and vomiting.     Genitourinary: Negative for difficulty urinating, sexual problems and frequent urination.     Musc: Negative for aching joints, aching muscles, back pain and neck pain.     Skin: Negative for rash.     Allergy: Positive for seasonal allergies.     Endocrine: Negative for cold intolerance and heat intolerance.      Neurological: Negative for dizziness, headaches, light-headedness, seizures and tremors.      Hematologic: Negative for bruises/bleeds easily and swollen glands.      Psychiatric: Negative for decreased concentration, depression, nervous/anxious and sleep disturbance.        Objective:   BP (!) 157/83   Pulse 62      Constitutional:   She is oriented to person, place, and time. She appears well-developed and well-nourished. She appears alert. She is cooperative.  Non-toxic appearance. She does not have a sickly appearance. She does not appear ill. Normal speech.      Head:  Normocephalic and atraumatic. Not macrocephalic and not microcephalic. Head is without raccoon's eyes, without Hart's sign, without abrasion, without laceration, without right periorbital erythema, without left periorbital erythema and without TMJ tenderness.     Ears:    Right Ear: No lacerations. No drainage, swelling or tenderness. No foreign bodies. No mastoid tenderness. Tympanic membrane is not injected, not scarred, not perforated, not erythematous, not retracted and not bulging. No middle ear effusion. No hemotympanum.   Left Ear: No lacerations. No drainage, swelling or tenderness. No foreign bodies. No mastoid tenderness. Tympanic membrane is not injected, not scarred, not perforated, not erythematous, not retracted and not  bulging.  No middle ear effusion. No hemotympanum.     Nose:  Right sinus exhibits no maxillary sinus tenderness and no frontal sinus tenderness. Left sinus exhibits no maxillary sinus tenderness and no frontal sinus tenderness.     Neck:  No adenopathy.     Pulmonary/Chest:   Effort normal.     Psychiatric:   She has a normal mood and affect. Her speech is normal and behavior is normal.     Neurological:   She is alert and oriented to person, place, and time. Coordination and gait normal.   Procedure  Cerumen removal performed.  See procedure note.    Data Reviewed  WBC (Thousand/uL)   Date Value   09/12/2022 4.7     Eosinophil % (%)   Date Value   09/12/2022 4.1 (H)     Eos # (cells/uL)   Date Value   06/15/2020 193     Platelets (Thousand/uL)   Date Value   09/12/2022 164     Glucose (mg/dL)   Date Value   09/12/2022 84     No results found for: IGE    Audiogram      I independently reviewed the tracings of the complete audiometric evaluation performed today.  I reviewed the audiogram with the patient as well.  Pertinent findings include a normal study.  Assessment:     1. Impacted cerumen of left ear    2. Normal hearing exam      Plan:   Impacted cerumen of left ear  -     Ear Cerumen Removal    Normal hearing exam  Reviewed patient's audiometric testing interpretation which is consistent with normal hearing bilaterally.  Hearing conservation strongly recommended when in noisy environments.  Patient encouraged to return to clinic every 2-3 years for audiometric testing.

## 2022-11-16 ENCOUNTER — OFFICE VISIT (OUTPATIENT)
Dept: INTERNAL MEDICINE | Facility: CLINIC | Age: 72
End: 2022-11-16
Payer: MEDICARE

## 2022-11-16 DIAGNOSIS — J45.30 MILD PERSISTENT EXTRINSIC ASTHMA WITHOUT COMPLICATION: Primary | ICD-10-CM

## 2022-11-16 DIAGNOSIS — J45.20 MILD INTERMITTENT EXTRINSIC ASTHMA WITHOUT COMPLICATION: ICD-10-CM

## 2022-11-16 DIAGNOSIS — M81.0 AGE-RELATED OSTEOPOROSIS WITHOUT CURRENT PATHOLOGICAL FRACTURE: Primary | ICD-10-CM

## 2022-11-16 PROCEDURE — 99214 PR OFFICE/OUTPT VISIT, EST, LEVL IV, 30-39 MIN: ICD-10-PCS | Mod: 95,,, | Performed by: STUDENT IN AN ORGANIZED HEALTH CARE EDUCATION/TRAINING PROGRAM

## 2022-11-16 PROCEDURE — 99214 OFFICE O/P EST MOD 30 MIN: CPT | Mod: 95,,, | Performed by: STUDENT IN AN ORGANIZED HEALTH CARE EDUCATION/TRAINING PROGRAM

## 2022-11-16 PROCEDURE — 99499 UNLISTED E&M SERVICE: CPT | Mod: 95,,, | Performed by: STUDENT IN AN ORGANIZED HEALTH CARE EDUCATION/TRAINING PROGRAM

## 2022-11-16 PROCEDURE — 99499 RISK ADDL DX/OHS AUDIT: ICD-10-PCS | Mod: 95,,, | Performed by: STUDENT IN AN ORGANIZED HEALTH CARE EDUCATION/TRAINING PROGRAM

## 2022-11-16 RX ORDER — RISEDRONATE SODIUM 150 MG/1
150 TABLET, FILM COATED ORAL
Qty: 3 TABLET | Refills: 3 | Status: SHIPPED | OUTPATIENT
Start: 2022-11-16 | End: 2023-03-22 | Stop reason: SDUPTHER

## 2022-11-16 RX ORDER — FLUTICASONE PROPIONATE AND SALMETEROL 250; 50 UG/1; UG/1
1 POWDER RESPIRATORY (INHALATION) 2 TIMES DAILY
Qty: 180 EACH | Refills: 3 | Status: SHIPPED | OUTPATIENT
Start: 2022-11-16 | End: 2022-11-16 | Stop reason: SDUPTHER

## 2022-11-16 RX ORDER — FLUTICASONE FUROATE AND VILANTEROL 100; 25 UG/1; UG/1
1 POWDER RESPIRATORY (INHALATION) DAILY
Qty: 90 EACH | Refills: 3 | Status: SHIPPED | OUTPATIENT
Start: 2022-11-16 | End: 2023-11-16

## 2022-11-16 RX ORDER — ALBUTEROL SULFATE 90 UG/1
2 AEROSOL, METERED RESPIRATORY (INHALATION) EVERY 6 HOURS PRN
Qty: 18 G | Refills: 11 | Status: SHIPPED | OUTPATIENT
Start: 2022-11-16

## 2022-11-16 NOTE — PROGRESS NOTES
"The patient's location is:  Patient's Home   The chief complaint leading to consultation is:  Age-related osteoporosis without current pathological fracture [M81.0]    Visit type: audiovisual    Time spent in discussion with the patient: 17 minutes  23 minutes of total time spent on the encounter. This includes time spent in discussion with the patient and time preparing for the patient appointment: review of tests, obtaining and/or reviewing separately obtained history, documenting clinical information in the electronic or other health record, independently interpreting results (not separately reported), and communicating results to the patient/family/caregiver or care coordination (not separately reported).     Each patient to whom he or she provides medical services by telemedicine is: (1) informed of the relationship between the physician and patient and the respective role of any other health care provider with respect to management of the patient; and (2) notified that he or she may decline to receive medical services by telemedicine and may withdraw from such care at any time.      Subjective:   Marian Hardy is a 72 y.o. female who presents for Age-related osteoporosis without current pathological fracture [M81.0].       Patient is established with me, here today for the following:    HTN, asthma, osteoporosis, vitamin D deficiency, environmental allergies    Osteoporosis -   Vitamin D deficiency -  Decreasing bone density on recent DEXA  DEXA OCT2022 with osteoporosis both hips  "Major Osteoporotic Fracture 16.4%. Hip Fracture 5.9%."  Currently taking vitamin D3 2,000 IU supplementation daily  SEP2022 Vitamin D 35   Taking calcium supplementation daily  DEXA in 2020 with osteopenia  Previously on bisphosphonate therapy with Alendronate   Discontinued >10 years ago  Family history of osteoporosis.    Asthma -   Increase in symptoms with seasonal change  Not currently using rescue inhaler.  Uses Advair " for daily controller during flares.  Needs refill on Advair inhaler  Taking Singulair daily.    Flares are triggered by seasonal change, allergies, illness.         Review of Systems   Constitutional:  Negative for activity change and unexpected weight change.   HENT:  Negative for hearing loss, rhinorrhea and trouble swallowing.    Eyes:  Negative for discharge and visual disturbance.   Respiratory:  Positive for wheezing. Negative for chest tightness.    Cardiovascular:  Negative for chest pain and palpitations.   Gastrointestinal:  Negative for blood in stool, constipation, diarrhea and vomiting.   Endocrine: Negative for polydipsia and polyuria.   Genitourinary:  Negative for difficulty urinating, dysuria, hematuria and menstrual problem.   Musculoskeletal:  Negative for arthralgias, joint swelling and neck pain.   Neurological:  Negative for weakness and headaches.   Psychiatric/Behavioral:  Negative for confusion and dysphoric mood.        Current Outpatient Medications   Medication Instructions    albuterol (PROAIR HFA) 90 mcg/actuation inhaler 2 puffs, Inhalation, Every 6 hours PRN, Rescue    calcium carbonate (OS-DA) 600 mg, Oral, Daily    fluticasone propionate (FLONASE) 50 mcg, Each Nostril, Daily    fluticasone-salmeterol diskus inhaler 250-50 mcg 1 puff, Inhalation, 2 times daily, Controller    losartan (COZAAR) 25 mg, Oral, Daily    montelukast (SINGULAIR) 10 mg, Oral, Nightly    risedronate (ACTONEL) 150 mg, Oral, Every 30 days       Objective:     Vitals - 1 value per visit 10/26/2022   SYSTOLIC 157   DIASTOLIC 83   Pulse 62        Physical Exam  Constitutional:       General: She is not in acute distress.     Appearance: Normal appearance. She is not ill-appearing or diaphoretic.   Neurological:      Mental Status: She is alert.   Psychiatric:         Attention and Perception: Attention normal.         Mood and Affect: Mood and affect normal.         Speech: Speech normal.         Assessment/Plan:        Age-related osteoporosis without current pathological fracture  Discussed risks vs benefits of restarting treatment, options for treatment including bisphosphonate vs Prolia  Agreeable to restarting bisphosphonate, would prefer not to restart alendronate  Start risedronate monthly, discussed directions for use  Recheck DEXA in 1 year  -     risedronate (ACTONEL) 150 MG Tab; Take 1 tablet (150 mg total) by mouth every 30 days.    Mild intermittent extrinsic asthma without complication  -     fluticasone-salmeterol diskus inhaler 250-50 mcg; Inhale 1 puff into the lungs 2 (two) times daily. Controller  -     albuterol (PROAIR HFA) 90 mcg/actuation inhaler; Inhale 2 puffs into the lungs every 6 (six) hours as needed for Wheezing or Shortness of Breath. Rescue      Shantal Beatty MD  11/16/2022

## 2022-11-17 ENCOUNTER — TELEPHONE (OUTPATIENT)
Dept: PHARMACY | Facility: CLINIC | Age: 72
End: 2022-11-17
Payer: MEDICARE

## 2023-02-07 DIAGNOSIS — Z00.00 ENCOUNTER FOR MEDICARE ANNUAL WELLNESS EXAM: ICD-10-CM

## 2023-02-09 DIAGNOSIS — Z00.00 ENCOUNTER FOR MEDICARE ANNUAL WELLNESS EXAM: ICD-10-CM

## 2023-03-22 ENCOUNTER — OFFICE VISIT (OUTPATIENT)
Dept: INTERNAL MEDICINE | Facility: CLINIC | Age: 73
End: 2023-03-22
Payer: MEDICARE

## 2023-03-22 VITALS
BODY MASS INDEX: 21.14 KG/M2 | DIASTOLIC BLOOD PRESSURE: 82 MMHG | WEIGHT: 111.88 LBS | SYSTOLIC BLOOD PRESSURE: 138 MMHG | OXYGEN SATURATION: 98 % | HEART RATE: 79 BPM

## 2023-03-22 DIAGNOSIS — Z76.0 ENCOUNTER FOR MEDICATION REFILL: Primary | ICD-10-CM

## 2023-03-22 DIAGNOSIS — R79.9 ABNORMAL FINDING OF BLOOD CHEMISTRY, UNSPECIFIED: ICD-10-CM

## 2023-03-22 DIAGNOSIS — Z91.09 ENVIRONMENTAL ALLERGIES: ICD-10-CM

## 2023-03-22 DIAGNOSIS — I49.9 IRREGULAR HEART BEAT: ICD-10-CM

## 2023-03-22 DIAGNOSIS — J30.1 SEASONAL ALLERGIC RHINITIS DUE TO POLLEN: ICD-10-CM

## 2023-03-22 DIAGNOSIS — M81.0 AGE-RELATED OSTEOPOROSIS WITHOUT CURRENT PATHOLOGICAL FRACTURE: ICD-10-CM

## 2023-03-22 DIAGNOSIS — I10 ESSENTIAL HYPERTENSION: ICD-10-CM

## 2023-03-22 PROCEDURE — 99214 OFFICE O/P EST MOD 30 MIN: CPT | Mod: HCNC,S$GLB,, | Performed by: PHYSICIAN ASSISTANT

## 2023-03-22 PROCEDURE — 99214 PR OFFICE/OUTPT VISIT, EST, LEVL IV, 30-39 MIN: ICD-10-PCS | Mod: HCNC,S$GLB,, | Performed by: PHYSICIAN ASSISTANT

## 2023-03-22 PROCEDURE — 99999 PR PBB SHADOW E&M-EST. PATIENT-LVL III: ICD-10-PCS | Mod: PBBFAC,HCNC,, | Performed by: PHYSICIAN ASSISTANT

## 2023-03-22 PROCEDURE — 99999 PR PBB SHADOW E&M-EST. PATIENT-LVL III: CPT | Mod: PBBFAC,HCNC,, | Performed by: PHYSICIAN ASSISTANT

## 2023-03-22 PROCEDURE — 99213 OFFICE O/P EST LOW 20 MIN: CPT | Mod: PBBFAC,HCNC | Performed by: PHYSICIAN ASSISTANT

## 2023-03-22 RX ORDER — FLUTICASONE PROPIONATE 50 MCG
1 SPRAY, SUSPENSION (ML) NASAL DAILY
Qty: 16 ML | Refills: 0
Start: 2023-03-22 | End: 2023-07-24

## 2023-03-22 RX ORDER — MONTELUKAST SODIUM 10 MG/1
10 TABLET ORAL NIGHTLY
Qty: 90 TABLET | Refills: 3 | Status: SHIPPED | OUTPATIENT
Start: 2023-03-22 | End: 2023-09-05 | Stop reason: ALTCHOICE

## 2023-03-22 RX ORDER — LOSARTAN POTASSIUM 25 MG/1
25 TABLET ORAL DAILY
Qty: 90 TABLET | Refills: 3 | Status: SHIPPED | OUTPATIENT
Start: 2023-03-22 | End: 2023-10-11

## 2023-03-22 RX ORDER — RISEDRONATE SODIUM 150 MG/1
150 TABLET, FILM COATED ORAL
Qty: 3 TABLET | Refills: 3 | Status: SHIPPED | OUTPATIENT
Start: 2023-03-22 | End: 2024-04-28

## 2023-03-22 NOTE — PROGRESS NOTES
INTERNAL MEDICINE PROGRESS NOTE    CHIEF COMPLAINT     Chief Complaint   Patient presents with    Medication Refill       HPI     Marian Hardy is a 72 y.o. female who presents for a follow-up visit today.    PCP is Shantal Beatty MD, patient is new to me.     Patient presents for 6 month follow-up.     She has age-related osteoporosis and at last office visit with PCP 6 months ago she was prescribed Actonel to be taken once monthly.  She is also taking O[place] daily.  She also has mild intermittent asthma for which she is prescribed albuterol and Breo Ellipta, Singulair, Flonase    She has hypertension for which she takes losartan 25 mg daily.    Today in clinic she is feeling well. She has no complaints.       Past Medical History:  Past Medical History:   Diagnosis Date    Colon polyps     Hypertension     MVP (mitral valve prolapse)     Osteoporosis        Home Medications:  Prior to Admission medications    Medication Sig Start Date End Date Taking? Authorizing Provider   albuterol (PROAIR HFA) 90 mcg/actuation inhaler Inhale 2 puffs into the lungs every 6 (six) hours as needed for Wheezing or Shortness of Breath. Rescue 11/16/22   Shantal Beatty MD   calcium carbonate (OS-DA) 600 mg calcium (1,500 mg) Tab Take 600 mg by mouth once daily.    Historical Provider   fluticasone furoate-vilanteroL (BREO ELLIPTA) 100-25 mcg/dose diskus inhaler Inhale 1 puff into the lungs once daily. Controller 11/16/22 11/16/23  Shantal Beatty MD   fluticasone propionate (FLONASE) 50 mcg/actuation nasal spray 1 spray (50 mcg total) by Each Nostril route once daily. 8/29/22   Zeenat Patino MD   losartan (COZAAR) 25 MG tablet Take 1 tablet (25 mg total) by mouth once daily. 10/11/22 4/9/23  Shantal Beatty MD   montelukast (SINGULAIR) 10 mg tablet Take 1 tablet (10 mg total) by mouth every evening. 10/11/22   Shantal Beatty MD   risedronate (ACTONEL) 150 MG Tab Take 1 tablet (150 mg total) by mouth every 30  days. 11/16/22 11/16/23  Shantal Beatty MD       Review of Systems:  Review of Systems   Constitutional:  Negative for chills and fever.   HENT:  Negative for sore throat and trouble swallowing.    Eyes:  Negative for visual disturbance.   Respiratory:  Negative for cough and shortness of breath.    Cardiovascular:  Negative for chest pain.   Gastrointestinal:  Negative for abdominal pain, constipation, diarrhea, nausea and vomiting.   Genitourinary:  Negative for dysuria and flank pain.   Musculoskeletal:  Negative for back pain, neck pain and neck stiffness.   Skin:  Negative for rash.   Neurological:  Negative for dizziness, syncope, weakness and headaches.   Psychiatric/Behavioral:  Negative for confusion.      Health Maintainence:   Immunizations:  Health Maintenance         Date Due Completion Date    TETANUS VACCINE Never done ---    Mammogram 08/29/2024 8/29/2022    Override on 4/6/2017: Done (normal)    Override on 11/11/2014: Done    Override on 11/11/2013: Done    Colorectal Cancer Screening 09/12/2025 9/12/2022    DEXA Scan 10/14/2025 10/14/2022    Override on 1/14/2020: Done    Override on 8/2/2017: Done    Lipid Panel 09/12/2027 9/12/2022             PHYSICAL EXAM     /82   Pulse 79   Wt 50.8 kg (111 lb 14.1 oz)   SpO2 98%   BMI 21.14 kg/m²     Physical Exam  Vitals and nursing note reviewed.   Constitutional:       Appearance: Normal appearance.      Comments: Healthy-appearing female in no acute distress or apparent pain.  She makes good eye contact, speaks in clear full sentences and ambulates with ease.   HENT:      Head: Normocephalic and atraumatic.      Nose: Nose normal.      Mouth/Throat:      Pharynx: Oropharynx is clear.   Eyes:      Conjunctiva/sclera: Conjunctivae normal.   Cardiovascular:      Rate and Rhythm: Normal rate. Rhythm irregular.      Pulses: Normal pulses.   Pulmonary:      Effort: No respiratory distress.   Abdominal:      Tenderness: There is no abdominal  tenderness.   Musculoskeletal:         General: Normal range of motion.      Cervical back: No rigidity.   Skin:     General: Skin is warm and dry.      Capillary Refill: Capillary refill takes less than 2 seconds.      Findings: No rash.   Neurological:      General: No focal deficit present.      Mental Status: She is alert.      Gait: Gait normal.   Psychiatric:         Mood and Affect: Mood normal.       LABS     No results found for: LABA1C, HGBA1C  CMP  Sodium   Date Value Ref Range Status   09/12/2022 135 135 - 145 mmol/L Final     Potassium   Date Value Ref Range Status   09/12/2022 4.3 3.5 - 5.0 mmol/L Final     Chloride   Date Value Ref Range Status   09/12/2022 97 (L) 98 - 107 mmol/L Final     CO2   Date Value Ref Range Status   09/12/2022 25 21 - 31 mmol/L Final     Comment:     Possible interference observed for Total Bilirubin with  immunoglobulin G (IgG) with concentrations above 28 g/L  (187 umol/L).       Glucose   Date Value Ref Range Status   09/12/2022 84 70 - 100 mg/dL Final     BUN   Date Value Ref Range Status   09/12/2022 12.0 7.0 - 21.0 mg/dL Final     Creatinine   Date Value Ref Range Status   09/12/2022 0.77 0.50 - 1.00 mg/dL Final     Calcium   Date Value Ref Range Status   09/12/2022 9.3 8.5 - 10.3 mg/dL Final     Total Protein   Date Value Ref Range Status   09/12/2022 6.8 6.3 - 8.2 g/dL Final   06/15/2020 7.1 6.1 - 8.1 g/dL Final     Albumin   Date Value Ref Range Status   06/15/2020 4.4 3.6 - 5.1 g/dL Final     ALBUMIN   Date Value Ref Range Status   09/12/2022 4.5 3.5 - 5.0 g/dL Final     Total Bilirubin   Date Value Ref Range Status   09/12/2022 0.5 0.0 - 1.2 mg/dL Final     Alkaline Phosphatase   Date Value Ref Range Status   09/12/2022 53 38 - 126 U/L Final     AST   Date Value Ref Range Status   09/12/2022 15 7 - 40 U/L Final     ALT   Date Value Ref Range Status   09/12/2022 11 7 - 56 U/L Final     Anion Gap   Date Value Ref Range Status   09/12/2022 17.3 9 - 18 mmol/L Final      eGFR if    Date Value Ref Range Status   06/15/2020 86 > OR = 60 mL/min/1.73m2 Final     eGFR if non    Date Value Ref Range Status   06/15/2020 74 > OR = 60 mL/min/1.73m2 Final     Lab Results   Component Value Date    WBC 4.7 09/12/2022    HGB 13.0 09/12/2022    HCT 39.5 09/12/2022    MCV 93.7 09/12/2022     09/12/2022     Lab Results   Component Value Date    CHOL 205 (H) 09/12/2022    CHOL 201 (H) 06/30/2021    CHOL 198 06/15/2020     Lab Results   Component Value Date    HDL 85 (H) 09/12/2022    HDL 84 (H) 06/30/2021    HDL 85 06/15/2020     Lab Results   Component Value Date    LDLCALC 110 09/12/2022    LDLCALC 108 06/30/2021    LDLCALC 98 06/15/2020     Lab Results   Component Value Date    TRIG 62 09/12/2022    TRIG 56 06/30/2021    TRIG 64 06/15/2020     Lab Results   Component Value Date    CHOLHDL 2.3 06/15/2020    CHOLHDL 2.5 03/12/2018    CHOLHDL 2.5 01/07/2016     Lab Results   Component Value Date    TSH 1.78 09/12/2022       ASSESSMENT/PLAN     Marian Hardy is a 72 y.o. female     Marian was seen today for medication refill.    Diagnoses and all orders for this visit:    Encounter for medication refill    Essential hypertension  -     CBC Auto Differential; Future  -     Comprehensive Metabolic Panel; Future  -     Hemoglobin A1C; Future  -     Lipid Panel; Future  -     TSH; Future  -     losartan (COZAAR) 25 MG tablet; Take 1 tablet (25 mg total) by mouth once daily.    Environmental allergies  -     montelukast (SINGULAIR) 10 mg tablet; Take 1 tablet (10 mg total) by mouth every evening.    Age-related osteoporosis without current pathological fracture  -     risedronate (ACTONEL) 150 MG Tab; Take 1 tablet (150 mg total) by mouth every 30 days.    Seasonal allergic rhinitis due to pollen  -     fluticasone propionate (FLONASE) 50 mcg/actuation nasal spray; 1 spray (50 mcg total) by Each Nostril route once daily.    Abnormal finding of blood  chemistry, unspecified  -     Hemoglobin A1C; Future    Irregular heart beat  -     EKG 12-lead; Future    RTC to see PCP in 6 months for annual exam       Kay Alex PA-C   Department of Internal Medicine - [facility]   8:34 AM

## 2023-05-01 ENCOUNTER — PES CALL (OUTPATIENT)
Dept: ADMINISTRATIVE | Facility: CLINIC | Age: 73
End: 2023-05-01
Payer: MEDICARE

## 2023-05-24 ENCOUNTER — TELEPHONE (OUTPATIENT)
Dept: ALLERGY | Facility: CLINIC | Age: 73
End: 2023-05-24
Payer: MEDICARE

## 2023-05-24 NOTE — TELEPHONE ENCOUNTER
Good morning ,    Kindly note that I rang patient and was unable to get through.    I left a voice message with clinic's number for patient to ring so she can be assisted with making an appointment.    Kind regards  Fatemeh Gamez MA        ----- Message from Ca Mora sent at 5/24/2023  9:38 AM CDT -----  Regarding: Appt Access  Contact: [phone]  NP has appt sched for 07/24/23 for Bronchitis/Asthma and would like to be seen sooner.  No avail appts. Please call.

## 2023-05-25 ENCOUNTER — TELEPHONE (OUTPATIENT)
Dept: ALLERGY | Facility: CLINIC | Age: 73
End: 2023-05-25
Payer: MEDICARE

## 2023-05-25 NOTE — TELEPHONE ENCOUNTER
Good morning Dr. Cid,    I rang and left a message for the patient to make contact with her preferred dates for the appointment to be brought forward if we have availability.    Kind regards  Fatemeh Gamez MA           ----- Message from Shorty Mcfarland sent at 5/25/2023 11:09 AM CDT -----  Regarding: Sooner appt needed  Contact: @[phone]  Pt requesting a call back to schedule a sooner appt than 07/24/23.  Please call to discuss further.  Pt states if she cannot be reached by phone she can receive a text and portal message with dates and times.

## 2023-05-26 ENCOUNTER — TELEPHONE (OUTPATIENT)
Dept: ALLERGY | Facility: CLINIC | Age: 73
End: 2023-05-26
Payer: MEDICARE

## 2023-05-26 NOTE — TELEPHONE ENCOUNTER
Called to assist with find a new sooner appointment.I couldn't get through, left a message for the patient to give the clinic a call back       ----- Message from Tiffanie Casey CMA sent at 5/26/2023  9:00 AM CDT -----  Regarding: Sooner appt request  Contact: [phone]  Hi,    Pt states she would like to be seen next week, she is experiencing wheezing and nasal drainage.    Please call pt to advise. The pt is willing to see any provider.     Thank you

## 2023-05-26 NOTE — TELEPHONE ENCOUNTER
"Called Patient to inform her that the appointment that she has is the first available there is nothing sooner. Pt stated that she will look around for another Dr. because she is sick and she need to be see soon and can not wait two months.        ----- Message from Tiffanie Casey CMA sent at 5/26/2023  9:58 AM CDT -----  Regarding: Appt request next week  Contact: [phone]  Hi,    Pt called back this morning to give dates of availability, she states the vm went into "blocked". Please forgive her. Pt states she is available any day next week except after 2p Friday.    Thank you    "

## 2023-07-24 ENCOUNTER — OFFICE VISIT (OUTPATIENT)
Dept: ALLERGY | Facility: CLINIC | Age: 73
End: 2023-07-24
Payer: MEDICARE

## 2023-07-24 VITALS
WEIGHT: 112 LBS | DIASTOLIC BLOOD PRESSURE: 75 MMHG | HEART RATE: 72 BPM | OXYGEN SATURATION: 100 % | SYSTOLIC BLOOD PRESSURE: 154 MMHG | BODY MASS INDEX: 21.16 KG/M2

## 2023-07-24 DIAGNOSIS — J98.8 WHEEZING-ASSOCIATED RESPIRATORY INFECTION (WARI): ICD-10-CM

## 2023-07-24 DIAGNOSIS — J31.0 CHRONIC RHINITIS: Primary | ICD-10-CM

## 2023-07-24 PROCEDURE — 1159F PR MEDICATION LIST DOCUMENTED IN MEDICAL RECORD: ICD-10-PCS | Mod: HCNC,CPTII,S$GLB, | Performed by: ALLERGY & IMMUNOLOGY

## 2023-07-24 PROCEDURE — 3077F PR MOST RECENT SYSTOLIC BLOOD PRESSURE >= 140 MM HG: ICD-10-PCS | Mod: HCNC,CPTII,S$GLB, | Performed by: ALLERGY & IMMUNOLOGY

## 2023-07-24 PROCEDURE — 4010F ACE/ARB THERAPY RXD/TAKEN: CPT | Mod: HCNC,CPTII,S$GLB, | Performed by: ALLERGY & IMMUNOLOGY

## 2023-07-24 PROCEDURE — 3077F SYST BP >= 140 MM HG: CPT | Mod: HCNC,CPTII,S$GLB, | Performed by: ALLERGY & IMMUNOLOGY

## 2023-07-24 PROCEDURE — 1126F PR PAIN SEVERITY QUANTIFIED, NO PAIN PRESENT: ICD-10-PCS | Mod: HCNC,CPTII,S$GLB, | Performed by: ALLERGY & IMMUNOLOGY

## 2023-07-24 PROCEDURE — 3008F BODY MASS INDEX DOCD: CPT | Mod: HCNC,CPTII,S$GLB, | Performed by: ALLERGY & IMMUNOLOGY

## 2023-07-24 PROCEDURE — 3078F DIAST BP <80 MM HG: CPT | Mod: HCNC,CPTII,S$GLB, | Performed by: ALLERGY & IMMUNOLOGY

## 2023-07-24 PROCEDURE — 1159F MED LIST DOCD IN RCRD: CPT | Mod: HCNC,CPTII,S$GLB, | Performed by: ALLERGY & IMMUNOLOGY

## 2023-07-24 PROCEDURE — 99204 PR OFFICE/OUTPT VISIT, NEW, LEVL IV, 45-59 MIN: ICD-10-PCS | Mod: HCNC,S$GLB,, | Performed by: ALLERGY & IMMUNOLOGY

## 2023-07-24 PROCEDURE — 99999 PR PBB SHADOW E&M-EST. PATIENT-LVL III: CPT | Mod: PBBFAC,HCNC,, | Performed by: ALLERGY & IMMUNOLOGY

## 2023-07-24 PROCEDURE — 3008F PR BODY MASS INDEX (BMI) DOCUMENTED: ICD-10-PCS | Mod: HCNC,CPTII,S$GLB, | Performed by: ALLERGY & IMMUNOLOGY

## 2023-07-24 PROCEDURE — 99999 PR PBB SHADOW E&M-EST. PATIENT-LVL III: ICD-10-PCS | Mod: PBBFAC,HCNC,, | Performed by: ALLERGY & IMMUNOLOGY

## 2023-07-24 PROCEDURE — 99204 OFFICE O/P NEW MOD 45 MIN: CPT | Mod: HCNC,S$GLB,, | Performed by: ALLERGY & IMMUNOLOGY

## 2023-07-24 PROCEDURE — 1126F AMNT PAIN NOTED NONE PRSNT: CPT | Mod: HCNC,CPTII,S$GLB, | Performed by: ALLERGY & IMMUNOLOGY

## 2023-07-24 PROCEDURE — 3078F PR MOST RECENT DIASTOLIC BLOOD PRESSURE < 80 MM HG: ICD-10-PCS | Mod: HCNC,CPTII,S$GLB, | Performed by: ALLERGY & IMMUNOLOGY

## 2023-07-24 PROCEDURE — 4010F PR ACE/ARB THEARPY RXD/TAKEN: ICD-10-PCS | Mod: HCNC,CPTII,S$GLB, | Performed by: ALLERGY & IMMUNOLOGY

## 2023-07-24 RX ORDER — AZELASTINE 1 MG/ML
2 SPRAY, METERED NASAL 2 TIMES DAILY
Qty: 30 ML | Refills: 11 | Status: SHIPPED | OUTPATIENT
Start: 2023-07-24 | End: 2024-07-23

## 2023-07-24 RX ORDER — FLUTICASONE PROPIONATE 50 MCG
2 SPRAY, SUSPENSION (ML) NASAL DAILY
Qty: 16 G | Refills: 11 | Status: SHIPPED | OUTPATIENT
Start: 2023-07-24

## 2023-07-24 NOTE — PROGRESS NOTES
Subjective:       Patient ID: Marian Hardy is a 72 y.o. female.    Chief Complaint:  Allergies      HPI    Pt w perennial rhinitis--sneezing and rhinorrhea. Occ nasal congestion, itchy nose, itchy eyes, over last  7-8 year. Also occ cough. Currently controlled w singulair, flonase. Typicaly has flares in Nov/Dec and April/May. During these flares may have assoc wheeze. This past spring developed bronchitis and would like to decrease chances of this happening again. Wheeze is usually preceded by a week of poorly controlled rhinitis sx's. Would also like to consider trial off Singulair.  Denies freq need abx.  No previous allergy testing  No other triggers noted.  No eczema, no food allergy  No sinus surgery  Denies GERD    Environmental History: Pets in the home: none.  [place]: hardwood floors and [id number] carpeting  Tobacco Smoke in Home: no    Past Medical History:   Diagnosis Date    Allergy     Colon polyps     Hypertension     MVP (mitral valve prolapse)     Osteoporosis        Family History   Problem Relation Age of Onset    Osteoporosis Mother     Hypertension Mother     Heart failure Father     Alzheimer's disease Brother     Heart failure Brother     Breast cancer Maternal Aunt 40    Stroke Maternal Aunt     Brain cancer Maternal Grandfather     Allergies Daughter     Colon cancer Neg Hx     Ovarian cancer Neg Hx     Diabetes Neg Hx          Review of Systems   Constitutional:  Negative for activity change, fatigue and fever.   HENT:  Negative for congestion, postnasal drip, rhinorrhea, sinus pressure and sneezing.    Eyes:  Negative for discharge, redness and itching.   Respiratory:  Negative for cough, shortness of breath and wheezing.    Cardiovascular:  Negative for chest pain.   Gastrointestinal:  Negative for diarrhea, nausea and vomiting.   Genitourinary:  Negative for dysuria.   Musculoskeletal:  Negative for arthralgias and joint swelling.   Skin:  Negative for rash.   Neurological:   Negative for headaches.   Hematological:  Does not bruise/bleed easily.   Psychiatric/Behavioral:  Negative for behavioral problems and sleep disturbance.       Objective:   Physical Exam  Vitals and nursing note reviewed.   Constitutional:       General: She is not in acute distress.     Appearance: She is well-developed.   HENT:      Head: Normocephalic.      Right Ear: Tympanic membrane and external ear normal.      Left Ear: Tympanic membrane and external ear normal.      Nose: No septal deviation, mucosal edema or rhinorrhea.      Right Sinus: No maxillary sinus tenderness or frontal sinus tenderness.      Left Sinus: No maxillary sinus tenderness or frontal sinus tenderness.      Mouth/Throat:      Pharynx: Uvula midline. No uvula swelling.   Eyes:      General:         Right eye: No discharge.         Left eye: No discharge.      Conjunctiva/sclera: Conjunctivae normal.   Cardiovascular:      Rate and Rhythm: Normal rate and regular rhythm.   Pulmonary:      Effort: Pulmonary effort is normal. No respiratory distress.      Breath sounds: Normal breath sounds. No wheezing.   Abdominal:      General: Bowel sounds are normal.      Palpations: Abdomen is soft.      Tenderness: There is no abdominal tenderness.   Musculoskeletal:         General: No tenderness. Normal range of motion.      Cervical back: Normal range of motion and neck supple.   Lymphadenopathy:      Cervical: No cervical adenopathy.   Skin:     General: Skin is warm.      Findings: No erythema or rash.   Neurological:      Mental Status: She is alert and oriented to person, place, and time.   Psychiatric:         Behavior: Behavior normal.         Thought Content: Thought content normal.         Judgment: Judgment normal.         Labs reviewed:    No results found for: IGGSERUM, IGM, IGA, IGE  Eos #   Date Value Ref Range Status   06/15/2020 193 15 - 500 cells/uL Final   03/10/2017 293 15 - 500 cells/uL Final   01/07/2016 387 15 - 500 cells/uL  Final     Eosinophil %   Date Value Ref Range Status   09/12/2022 4.1 (H) 0 - 4 % Final   06/15/2020 4.6 % Final   03/10/2017 4.5 % Final           Assessment:       1. Chronic rhinitis w seasonal exacerbations. Consider allergic etiology    2. Wheezing-associated respiratory infection (WARI)         Plan:       Marian was seen today for allergies.    Diagnoses and all orders for this visit:    Chronic rhinitis    Wheezing-associated respiratory infection (WARI)    Other orders  -     fluticasone propionate (FLONASE) 50 mcg/actuation nasal spray; 2 sprays (100 mcg total) by Each Nostril route once daily.  -     azelastine (ASTELIN) 137 mcg (0.1 %) nasal spray; 2 sprays (274 mcg total) by Nasal route 2 (two) times daily. Ok prn    Albuterol prn  Trial off singulair    Defers allergy testing today  Reconsider at fu, ~4-6 mo        Total of 45 minutes on encounter the day of the visit. This includes face to face time and non-face to face time preparing to see the patient (eg, review of tests), obtaining and/or reviewing separately obtained history, documenting clinical information in the electronic or other health record, independently interpreting results and communicating results to the patient/family/caregiver, or care coordinator.

## 2023-09-01 ENCOUNTER — HOSPITAL ENCOUNTER (OUTPATIENT)
Dept: CARDIOLOGY | Facility: OTHER | Age: 73
Discharge: HOME | End: 2023-09-01
Attending: STUDENT IN AN ORGANIZED HEALTH CARE EDUCATION/TRAINING PROGRAM
Payer: MEDICARE

## 2023-09-01 DIAGNOSIS — I49.9 IRREGULAR HEART BEAT: ICD-10-CM

## 2023-09-01 PROCEDURE — 93010 EKG 12-LEAD: ICD-10-PCS | Mod: HCNC,,, | Performed by: INTERNAL MEDICINE

## 2023-09-01 PROCEDURE — 93010 ELECTROCARDIOGRAM REPORT: CPT | Mod: HCNC,,, | Performed by: INTERNAL MEDICINE

## 2023-09-01 PROCEDURE — 93005 ELECTROCARDIOGRAM TRACING: CPT | Mod: HCNC

## 2023-09-05 ENCOUNTER — OFFICE VISIT (OUTPATIENT)
Dept: INTERNAL MEDICINE | Facility: CLINIC | Age: 73
End: 2023-09-05
Payer: MEDICARE

## 2023-09-05 VITALS
OXYGEN SATURATION: 98 % | WEIGHT: 112.88 LBS | SYSTOLIC BLOOD PRESSURE: 140 MMHG | HEART RATE: 63 BPM | BODY MASS INDEX: 21.33 KG/M2 | DIASTOLIC BLOOD PRESSURE: 84 MMHG

## 2023-09-05 DIAGNOSIS — R00.2 PALPITATIONS: ICD-10-CM

## 2023-09-05 DIAGNOSIS — Z00.00 HEALTH MAINTENANCE EXAMINATION: Primary | ICD-10-CM

## 2023-09-05 DIAGNOSIS — I34.1 MITRAL VALVE PROLAPSE: ICD-10-CM

## 2023-09-05 DIAGNOSIS — I10 PRIMARY HYPERTENSION: ICD-10-CM

## 2023-09-05 DIAGNOSIS — E55.9 VITAMIN D DEFICIENCY: ICD-10-CM

## 2023-09-05 DIAGNOSIS — Z91.89 FRAMINGHAM CARDIAC RISK >20% IN NEXT 10 YEARS: ICD-10-CM

## 2023-09-05 DIAGNOSIS — J45.20 MILD INTERMITTENT EXTRINSIC ASTHMA WITHOUT COMPLICATION: ICD-10-CM

## 2023-09-05 DIAGNOSIS — M81.0 AGE-RELATED OSTEOPOROSIS WITHOUT CURRENT PATHOLOGICAL FRACTURE: ICD-10-CM

## 2023-09-05 DIAGNOSIS — Z23 NEED FOR VACCINATION: ICD-10-CM

## 2023-09-05 PROCEDURE — 3079F PR MOST RECENT DIASTOLIC BLOOD PRESSURE 80-89 MM HG: ICD-10-PCS | Mod: HCNC,CPTII,S$GLB, | Performed by: STUDENT IN AN ORGANIZED HEALTH CARE EDUCATION/TRAINING PROGRAM

## 2023-09-05 PROCEDURE — 3077F PR MOST RECENT SYSTOLIC BLOOD PRESSURE >= 140 MM HG: ICD-10-PCS | Mod: HCNC,CPTII,S$GLB, | Performed by: STUDENT IN AN ORGANIZED HEALTH CARE EDUCATION/TRAINING PROGRAM

## 2023-09-05 PROCEDURE — 4010F ACE/ARB THERAPY RXD/TAKEN: CPT | Mod: HCNC,CPTII,S$GLB, | Performed by: STUDENT IN AN ORGANIZED HEALTH CARE EDUCATION/TRAINING PROGRAM

## 2023-09-05 PROCEDURE — 3044F HG A1C LEVEL LT 7.0%: CPT | Mod: HCNC,CPTII,S$GLB, | Performed by: STUDENT IN AN ORGANIZED HEALTH CARE EDUCATION/TRAINING PROGRAM

## 2023-09-05 PROCEDURE — 99214 PR OFFICE/OUTPT VISIT, EST, LEVL IV, 30-39 MIN: ICD-10-PCS | Mod: HCNC,S$GLB,, | Performed by: STUDENT IN AN ORGANIZED HEALTH CARE EDUCATION/TRAINING PROGRAM

## 2023-09-05 PROCEDURE — 3008F PR BODY MASS INDEX (BMI) DOCUMENTED: ICD-10-PCS | Mod: HCNC,CPTII,S$GLB, | Performed by: STUDENT IN AN ORGANIZED HEALTH CARE EDUCATION/TRAINING PROGRAM

## 2023-09-05 PROCEDURE — 3077F SYST BP >= 140 MM HG: CPT | Mod: HCNC,CPTII,S$GLB, | Performed by: STUDENT IN AN ORGANIZED HEALTH CARE EDUCATION/TRAINING PROGRAM

## 2023-09-05 PROCEDURE — 99214 OFFICE O/P EST MOD 30 MIN: CPT | Mod: HCNC,S$GLB,, | Performed by: STUDENT IN AN ORGANIZED HEALTH CARE EDUCATION/TRAINING PROGRAM

## 2023-09-05 PROCEDURE — 3008F BODY MASS INDEX DOCD: CPT | Mod: HCNC,CPTII,S$GLB, | Performed by: STUDENT IN AN ORGANIZED HEALTH CARE EDUCATION/TRAINING PROGRAM

## 2023-09-05 PROCEDURE — 4010F PR ACE/ARB THEARPY RXD/TAKEN: ICD-10-PCS | Mod: HCNC,CPTII,S$GLB, | Performed by: STUDENT IN AN ORGANIZED HEALTH CARE EDUCATION/TRAINING PROGRAM

## 2023-09-05 PROCEDURE — 1101F PT FALLS ASSESS-DOCD LE1/YR: CPT | Mod: HCNC,CPTII,S$GLB, | Performed by: STUDENT IN AN ORGANIZED HEALTH CARE EDUCATION/TRAINING PROGRAM

## 2023-09-05 PROCEDURE — 1159F PR MEDICATION LIST DOCUMENTED IN MEDICAL RECORD: ICD-10-PCS | Mod: HCNC,CPTII,S$GLB, | Performed by: STUDENT IN AN ORGANIZED HEALTH CARE EDUCATION/TRAINING PROGRAM

## 2023-09-05 PROCEDURE — 3288F PR FALLS RISK ASSESSMENT DOCUMENTED: ICD-10-PCS | Mod: HCNC,CPTII,S$GLB, | Performed by: STUDENT IN AN ORGANIZED HEALTH CARE EDUCATION/TRAINING PROGRAM

## 2023-09-05 PROCEDURE — 3044F PR MOST RECENT HEMOGLOBIN A1C LEVEL <7.0%: ICD-10-PCS | Mod: HCNC,CPTII,S$GLB, | Performed by: STUDENT IN AN ORGANIZED HEALTH CARE EDUCATION/TRAINING PROGRAM

## 2023-09-05 PROCEDURE — 99999 PR PBB SHADOW E&M-EST. PATIENT-LVL III: ICD-10-PCS | Mod: PBBFAC,HCNC,, | Performed by: STUDENT IN AN ORGANIZED HEALTH CARE EDUCATION/TRAINING PROGRAM

## 2023-09-05 PROCEDURE — 3288F FALL RISK ASSESSMENT DOCD: CPT | Mod: HCNC,CPTII,S$GLB, | Performed by: STUDENT IN AN ORGANIZED HEALTH CARE EDUCATION/TRAINING PROGRAM

## 2023-09-05 PROCEDURE — 1159F MED LIST DOCD IN RCRD: CPT | Mod: HCNC,CPTII,S$GLB, | Performed by: STUDENT IN AN ORGANIZED HEALTH CARE EDUCATION/TRAINING PROGRAM

## 2023-09-05 PROCEDURE — 1101F PR PT FALLS ASSESS DOC 0-1 FALLS W/OUT INJ PAST YR: ICD-10-PCS | Mod: HCNC,CPTII,S$GLB, | Performed by: STUDENT IN AN ORGANIZED HEALTH CARE EDUCATION/TRAINING PROGRAM

## 2023-09-05 PROCEDURE — 3079F DIAST BP 80-89 MM HG: CPT | Mod: HCNC,CPTII,S$GLB, | Performed by: STUDENT IN AN ORGANIZED HEALTH CARE EDUCATION/TRAINING PROGRAM

## 2023-09-05 PROCEDURE — 1126F AMNT PAIN NOTED NONE PRSNT: CPT | Mod: HCNC,CPTII,S$GLB, | Performed by: STUDENT IN AN ORGANIZED HEALTH CARE EDUCATION/TRAINING PROGRAM

## 2023-09-05 PROCEDURE — 99999 PR PBB SHADOW E&M-EST. PATIENT-LVL III: CPT | Mod: PBBFAC,HCNC,, | Performed by: STUDENT IN AN ORGANIZED HEALTH CARE EDUCATION/TRAINING PROGRAM

## 2023-09-05 PROCEDURE — 1126F PR PAIN SEVERITY QUANTIFIED, NO PAIN PRESENT: ICD-10-PCS | Mod: HCNC,CPTII,S$GLB, | Performed by: STUDENT IN AN ORGANIZED HEALTH CARE EDUCATION/TRAINING PROGRAM

## 2023-09-05 NOTE — PROGRESS NOTES
"Subjective:       Patient ID: Marian Hardy is a 72 y.o. female.    Chief Complaint: Health maintenance examination [Z00.00]    Patient is established with me, here today for the following:     HTN, asthma, osteoporosis, vitamin D deficiency, environmental allergies    Reviewed recent labs today    Osteopenia -   Vitamin D deficiency -   Completed DEXA in OCT2022.  Showed osteoporosis both hips.  "Major Osteoporotic Fracture 16.4%. Hip Fracture 5.9%."  Taking vitamin D3 2,000 IU supplementation daily  Taking calcium supplementation daily  Currently taking risedronate. Started medication in NOV2022.  Family history of osteoporosis.     HTN -   Currently prescribed losartan.  Patient endorses taking medication as directed.  Denies side effects or concerns while taking medication.  Patient endorses home -132 and DBP 80's.  Declines micro albumin creatinine ratio.   BP Readings from Last 5 Encounters:  09/05/23 : (!) 182/84  07/24/23 : (!) 154/75  03/22/23 : 138/82  10/26/22 : (!) 157/83  10/07/22 : 122/84    Elevated ASCVD risk score -   Lab Results       Component                Value               Date                       CHOL                     197                 09/01/2023            Lab Results       Component                Value               Date                       TRIG                     64                  09/01/2023            Lab Results       Component                Value               Date                       LDLCALC                  106.2               09/01/2023            Lab Results       Component                Value               Date                       HDL                      78 (H)              09/01/2023            The 10-year ASCVD risk score (Pedro Pablo HILL, et al., 2019) is: 34.6%    Asthma -   Not currently needing to use Breo, using PRN.  Currently requiring albuterol inhaler once yearly.  Asthma flares are typically triggered by seasonal changes, allergies, " illness.  Never wakes up at night with symptoms.   No hospitalizations or office visits for asthma within the past year.  Currently following with Dr. Cid for allergy.      Endorses episodic palpitations 1-2 times monthly  No identified triggers  Self resolve rapidly within minutes  Endorses history of MVP  EKG MAR2023 sinus rhythm    Health maintenance -   [facility] SEP2022 negative, repeat in 3 years.  Denies family history of colorectal cancer.   Mammogram BI-RADS 1 in AUG2022.  Denies history of prior abnormal mammogram.  Family history of breast cancers.  Denies family history of ovarian cancers.   Denies history of abnormal pap smear.  Family history of cardiac disease.  UTD on COVID primary/booster, PPSV23, PCV13, shingles vaccinations.  Due for Tdap, COVID bivalent vaccinations.  Started at age 12, at most 1 PPD. Quit at age 22.  Drinks alcohol 5-6 times weekly, 1-2 drinks per sitting.  Denies drug use.  Completed hepatitis C screening.  UTD on diabetes screening.  Lab Results       Component                Value               Date                       HGBA1C                   5.3                 09/01/2023                 Review of Systems   Constitutional:  Negative for appetite change, chills, fatigue, fever and unexpected weight change.   Respiratory:  Negative for cough and shortness of breath.    Cardiovascular:  Negative for chest pain, palpitations and leg swelling.   Gastrointestinal:  Negative for abdominal pain, constipation, diarrhea, nausea and vomiting.   Skin:  Negative for rash.   Neurological:  Negative for dizziness, syncope, weakness and headaches.         Current Outpatient Medications   Medication Instructions    albuterol (PROAIR HFA) 90 mcg/actuation inhaler 2 puffs, Inhalation, Every 6 hours PRN, Rescue    azelastine (ASTELIN) 274 mcg, Nasal, 2 times daily    calcium carbonate (OS-DA) 600 mg, Oral, Daily    fluticasone furoate-vilanteroL (BREO ELLIPTA) 100-25 mcg/dose  diskus inhaler 1 puff, Inhalation, Daily, Controller    fluticasone propionate (FLONASE) 100 mcg, Each Nostril, Daily    losartan (COZAAR) 25 mg, Oral, Daily    montelukast (SINGULAIR) 10 mg, Oral, Nightly    risedronate (ACTONEL) 150 mg, Oral, Every 30 days     Objective:      Vitals:    09/05/23 0843 09/05/23 0924   BP: (!) 182/84 (!) 140/84   Pulse: 63    SpO2: 98%    Weight: 51.2 kg (112 lb 14 oz)    PainSc: 0-No pain      Body mass index is 21.33 kg/m².    Physical Exam  Vitals reviewed.   Constitutional:       General: She is not in acute distress.     Appearance: Normal appearance. She is not ill-appearing or diaphoretic.   HENT:      Head: Normocephalic and atraumatic.      Right Ear: Tympanic membrane, ear canal and external ear normal. There is no impacted cerumen.      Left Ear: Tympanic membrane, ear canal and external ear normal. There is no impacted cerumen.      Nose: Nose normal. No rhinorrhea.      Mouth/Throat:      Mouth: Mucous membranes are moist.      Pharynx: Oropharynx is clear. No oropharyngeal exudate or posterior oropharyngeal erythema.   Eyes:      General: No scleral icterus.        Right eye: No discharge.         Left eye: No discharge.      Conjunctiva/sclera: Conjunctivae normal.   Neck:      Thyroid: No thyromegaly or thyroid tenderness.      Trachea: Trachea normal.   Cardiovascular:      Rate and Rhythm: Normal rate and regular rhythm.      Heart sounds: Normal heart sounds. No murmur heard.     No friction rub. No gallop.   Pulmonary:      Effort: Pulmonary effort is normal. No respiratory distress.      Breath sounds: Normal breath sounds. No stridor. No wheezing, rhonchi or rales.   Abdominal:      General: There is no distension.      Palpations: Abdomen is soft.      Tenderness: There is no abdominal tenderness. There is no guarding or rebound.   Musculoskeletal:         General: No swelling or deformity.      Cervical back: Neck supple.   Lymphadenopathy:      Head:       Right side of head: No submandibular or posterior auricular adenopathy.      Left side of head: No submandibular or posterior auricular adenopathy.      Cervical: No cervical adenopathy.      Right cervical: No superficial, deep or posterior cervical adenopathy.     Left cervical: No superficial, deep or posterior cervical adenopathy.      Upper Body:      Right upper body: No supraclavicular adenopathy.      Left upper body: No supraclavicular adenopathy.   Skin:     General: Skin is warm and dry.   Neurological:      General: No focal deficit present.      Mental Status: She is alert. Mental status is at baseline.      Gait: Gait normal.   Psychiatric:         Mood and Affect: Mood normal.         Behavior: Behavior normal.       Assessment:       1. Health maintenance examination    2. Age-related osteoporosis without current pathological fracture    3. Vitamin D deficiency    4. Primary hypertension    5. [place] cardiac risk >20% in next 10 years    6. Mild intermittent extrinsic asthma without complication    7. Mitral valve prolapse    8. Palpitations    9. Need for vaccination        Plan:       Age-related osteoporosis without current pathological fracture  Continue current medication.  Continue vitamin D supplementation.  RTC in 6 months for follow up.  -     DXA Bone Density Axial Skeleton 1 or more sites; Future    Vitamin D deficiency  Continue supplementation.  RTC in 6 months for follow up.    Primary hypertension  Continue current medications.  RTC in 6 months for follow up.    [place] cardiac risk >20% in next 10 years  Declines statin therapy  RTC in 6 months for follow up.    Mild intermittent extrinsic asthma without complication  Continue current medications.  RTC in 6 months for follow up.    Mitral valve prolapse  Palpitations  -     Echo Saline Bubble? No; Future    Health maintenance examination  Reviewed and discussed age appropriate screenings and immunizations.    Need for  vaccination  Discussed vaccinations      Shantal Beatty MD  9/5/2023

## 2023-09-13 ENCOUNTER — TELEPHONE (OUTPATIENT)
Dept: ADMINISTRATIVE | Facility: HOSPITAL | Age: 73
End: 2023-09-13
Payer: MEDICARE

## 2023-09-13 ENCOUNTER — PATIENT MESSAGE (OUTPATIENT)
Dept: ADMINISTRATIVE | Facility: HOSPITAL | Age: 73
End: 2023-09-13
Payer: MEDICARE

## 2023-09-13 VITALS — DIASTOLIC BLOOD PRESSURE: 80 MMHG | SYSTOLIC BLOOD PRESSURE: 129 MMHG

## 2023-09-15 ENCOUNTER — PATIENT MESSAGE (OUTPATIENT)
Dept: INTERNAL MEDICINE | Facility: CLINIC | Age: 73
End: 2023-09-15
Payer: MEDICARE

## 2023-09-15 ENCOUNTER — TELEPHONE (OUTPATIENT)
Dept: INTERNAL MEDICINE | Facility: CLINIC | Age: 73
End: 2023-09-15
Payer: MEDICARE

## 2023-09-15 ENCOUNTER — HOSPITAL ENCOUNTER (OUTPATIENT)
Dept: CARDIOLOGY | Facility: HOSPITAL | Age: 73
Discharge: HOME | End: 2023-09-15
Attending: STUDENT IN AN ORGANIZED HEALTH CARE EDUCATION/TRAINING PROGRAM
Payer: MEDICARE

## 2023-09-15 VITALS
SYSTOLIC BLOOD PRESSURE: 130 MMHG | DIASTOLIC BLOOD PRESSURE: 80 MMHG | HEIGHT: 61 IN | WEIGHT: 112 LBS | BODY MASS INDEX: 21.14 KG/M2 | HEART RATE: 75 BPM

## 2023-09-15 DIAGNOSIS — R93.1 ABNORMAL ECHOCARDIOGRAM: Primary | ICD-10-CM

## 2023-09-15 DIAGNOSIS — I34.1 MITRAL VALVE PROLAPSE: ICD-10-CM

## 2023-09-15 DIAGNOSIS — R00.2 PALPITATIONS: ICD-10-CM

## 2023-09-15 LAB
ASCENDING AORTA: 2.91 CM
AV INDEX (PROSTH): 0.74
AV MEAN GRADIENT: 3 MMHG
AV PEAK GRADIENT: 7 MMHG
AV VALVE AREA BY VELOCITY RATIO: 2.05 CM²
AV VALVE AREA: 2.29 CM²
AV VELOCITY RATIO: 0.66
BSA FOR ECHO PROCEDURE: 1.48 M2
CV ECHO LV RWT: 0.4 CM
DOP CALC AO PEAK VEL: 1.35 M/S
DOP CALC AO VTI: 29.74 CM
DOP CALC LVOT AREA: 3.1 CM2
DOP CALC LVOT DIAMETER: 1.99 CM
DOP CALC LVOT PEAK VEL: 0.89 M/S
DOP CALC LVOT STROKE VOLUME: 68.02 CM3
DOP CALCLVOT PEAK VEL VTI: 21.88 CM
E WAVE DECELERATION TIME: 163.42 MSEC
E/A RATIO: 1
E/E' RATIO: 7.07 M/S
ECHO LV POSTERIOR WALL: 0.82 CM (ref 0.6–1.1)
FRACTIONAL SHORTENING: 31 % (ref 28–44)
INTERVENTRICULAR SEPTUM: 0.76 CM (ref 0.6–1.1)
IVRT: 83.73 MSEC
LA MAJOR: 4.35 CM
LA MINOR: 4.49 CM
LA WIDTH: 3.72 CM
LEFT ATRIUM SIZE: 3.1 CM
LEFT ATRIUM VOLUME INDEX MOD: 28.5 ML/M2
LEFT ATRIUM VOLUME INDEX: 29.3 ML/M2
LEFT ATRIUM VOLUME MOD: 42.23 CM3
LEFT ATRIUM VOLUME: 43.31 CM3
LEFT INTERNAL DIMENSION IN SYSTOLE: 2.87 CM (ref 2.1–4)
LEFT VENTRICLE DIASTOLIC VOLUME INDEX: 51.72 ML/M2
LEFT VENTRICLE DIASTOLIC VOLUME: 76.55 ML
LEFT VENTRICLE MASS INDEX: 66 G/M2
LEFT VENTRICLE SYSTOLIC VOLUME INDEX: 21.1 ML/M2
LEFT VENTRICLE SYSTOLIC VOLUME: 31.28 ML
LEFT VENTRICULAR INTERNAL DIMENSION IN DIASTOLE: 4.15 CM (ref 3.5–6)
LEFT VENTRICULAR MASS: 97.66 G
LV LATERAL E/E' RATIO: 5.89 M/S
LV SEPTAL E/E' RATIO: 8.83 M/S
MV A" WAVE DURATION": 17.89 MSEC
MV PEAK A VEL: 0.53 M/S
MV PEAK E VEL: 0.53 M/S
MV STENOSIS PRESSURE HALF TIME: 47.39 MS
MV VALVE AREA P 1/2 METHOD: 4.64 CM2
PISA TR MAX VEL: 2.28 M/S
PULM VEIN S/D RATIO: 1.17
PV PEAK D VEL: 0.42 M/S
PV PEAK S VEL: 0.49 M/S
RA MAJOR: 4.47 CM
RA PRESSURE ESTIMATED: 8 MMHG
RA WIDTH: 3.11 CM
RIGHT VENTRICULAR END-DIASTOLIC DIMENSION: 2.86 CM
RV TB RVSP: 10 MMHG
SINUS: 2.89 CM
STJ: 2.27 CM
TDI LATERAL: 0.09 M/S
TDI SEPTAL: 0.06 M/S
TDI: 0.08 M/S
TR MAX PG: 21 MMHG
TRICUSPID ANNULAR PLANE SYSTOLIC EXCURSION: 2.38 CM
TV REST PULMONARY ARTERY PRESSURE: 29 MMHG
Z-SCORE OF LEFT VENTRICULAR DIMENSION IN END DIASTOLE: -0.61
Z-SCORE OF LEFT VENTRICULAR DIMENSION IN END SYSTOLE: 0.37

## 2023-09-15 PROCEDURE — 93306 TTE W/DOPPLER COMPLETE: CPT | Mod: HCNC

## 2023-09-15 PROCEDURE — 93306 TTE W/DOPPLER COMPLETE: CPT | Mod: 26,HCNC,, | Performed by: INTERNAL MEDICINE

## 2023-09-15 PROCEDURE — 93306 ECHO (CUPID ONLY): ICD-10-PCS | Mod: 26,HCNC,, | Performed by: INTERNAL MEDICINE

## 2023-09-15 NOTE — TELEPHONE ENCOUNTER
----- Message from Shantal Beatty MD sent at 9/15/2023 11:05 AM CDT -----  Please assist patient with scheduling cardiology referral, thank you!

## 2023-09-20 ENCOUNTER — PATIENT MESSAGE (OUTPATIENT)
Dept: INTERNAL MEDICINE | Facility: CLINIC | Age: 73
End: 2023-09-20
Payer: MEDICARE

## 2023-09-20 VITALS — SYSTOLIC BLOOD PRESSURE: 137 MMHG | DIASTOLIC BLOOD PRESSURE: 82 MMHG

## 2023-09-25 ENCOUNTER — TELEPHONE (OUTPATIENT)
Dept: INTERNAL MEDICINE | Facility: CLINIC | Age: 73
End: 2023-09-25
Payer: MEDICARE

## 2023-09-25 NOTE — TELEPHONE ENCOUNTER
"----- Message from Virginia Octavio sent at 9/25/2023 12:47 PM CDT -----  Regarding: Returning Call                  Name of Who is Calling:    Shefali Hardy    Who Left The Message:    Shefali Hardy      What is the request in detail:     Patient called stating,  "she's returning the Office's call and would like you to please call again.  Please further advise.    Thank you      Reply by [facility]: NO      Preferred Call Back :  [phone] ([phone])                                           "

## 2023-09-28 ENCOUNTER — PATIENT MESSAGE (OUTPATIENT)
Dept: INTERNAL MEDICINE | Facility: CLINIC | Age: 73
End: 2023-09-28
Payer: MEDICARE

## 2023-10-11 ENCOUNTER — OFFICE VISIT (OUTPATIENT)
Dept: CARDIOLOGY | Facility: CLINIC | Age: 73
End: 2023-10-11
Payer: MEDICARE

## 2023-10-11 VITALS
WEIGHT: 111.13 LBS | BODY MASS INDEX: 20.98 KG/M2 | HEART RATE: 64 BPM | SYSTOLIC BLOOD PRESSURE: 168 MMHG | HEIGHT: 61 IN | DIASTOLIC BLOOD PRESSURE: 91 MMHG

## 2023-10-11 DIAGNOSIS — I10 ESSENTIAL HYPERTENSION: ICD-10-CM

## 2023-10-11 DIAGNOSIS — R93.1 ABNORMAL ECHOCARDIOGRAM: Primary | ICD-10-CM

## 2023-10-11 PROCEDURE — 1101F PT FALLS ASSESS-DOCD LE1/YR: CPT | Mod: CPTII,S$GLB,, | Performed by: INTERNAL MEDICINE

## 2023-10-11 PROCEDURE — 1126F AMNT PAIN NOTED NONE PRSNT: CPT | Mod: CPTII,S$GLB,, | Performed by: INTERNAL MEDICINE

## 2023-10-11 PROCEDURE — 99999 PR PBB SHADOW E&M-EST. PATIENT-LVL III: ICD-10-PCS | Mod: PBBFAC,,, | Performed by: INTERNAL MEDICINE

## 2023-10-11 PROCEDURE — 4010F ACE/ARB THERAPY RXD/TAKEN: CPT | Mod: CPTII,S$GLB,, | Performed by: INTERNAL MEDICINE

## 2023-10-11 PROCEDURE — 1160F RVW MEDS BY RX/DR IN RCRD: CPT | Mod: CPTII,S$GLB,, | Performed by: INTERNAL MEDICINE

## 2023-10-11 PROCEDURE — 3044F PR MOST RECENT HEMOGLOBIN A1C LEVEL <7.0%: ICD-10-PCS | Mod: CPTII,S$GLB,, | Performed by: INTERNAL MEDICINE

## 2023-10-11 PROCEDURE — 3077F PR MOST RECENT SYSTOLIC BLOOD PRESSURE >= 140 MM HG: ICD-10-PCS | Mod: CPTII,S$GLB,, | Performed by: INTERNAL MEDICINE

## 2023-10-11 PROCEDURE — 99204 OFFICE O/P NEW MOD 45 MIN: CPT | Mod: S$GLB,,, | Performed by: INTERNAL MEDICINE

## 2023-10-11 PROCEDURE — 3080F DIAST BP >= 90 MM HG: CPT | Mod: CPTII,S$GLB,, | Performed by: INTERNAL MEDICINE

## 2023-10-11 PROCEDURE — 3077F SYST BP >= 140 MM HG: CPT | Mod: CPTII,S$GLB,, | Performed by: INTERNAL MEDICINE

## 2023-10-11 PROCEDURE — 3080F PR MOST RECENT DIASTOLIC BLOOD PRESSURE >= 90 MM HG: ICD-10-PCS | Mod: CPTII,S$GLB,, | Performed by: INTERNAL MEDICINE

## 2023-10-11 PROCEDURE — 1159F PR MEDICATION LIST DOCUMENTED IN MEDICAL RECORD: ICD-10-PCS | Mod: CPTII,S$GLB,, | Performed by: INTERNAL MEDICINE

## 2023-10-11 PROCEDURE — 99999 PR PBB SHADOW E&M-EST. PATIENT-LVL III: CPT | Mod: PBBFAC,,, | Performed by: INTERNAL MEDICINE

## 2023-10-11 PROCEDURE — 1101F PR PT FALLS ASSESS DOC 0-1 FALLS W/OUT INJ PAST YR: ICD-10-PCS | Mod: CPTII,S$GLB,, | Performed by: INTERNAL MEDICINE

## 2023-10-11 PROCEDURE — 1160F PR REVIEW ALL MEDS BY PRESCRIBER/CLIN PHARMACIST DOCUMENTED: ICD-10-PCS | Mod: CPTII,S$GLB,, | Performed by: INTERNAL MEDICINE

## 2023-10-11 PROCEDURE — 1159F MED LIST DOCD IN RCRD: CPT | Mod: CPTII,S$GLB,, | Performed by: INTERNAL MEDICINE

## 2023-10-11 PROCEDURE — 4010F PR ACE/ARB THEARPY RXD/TAKEN: ICD-10-PCS | Mod: CPTII,S$GLB,, | Performed by: INTERNAL MEDICINE

## 2023-10-11 PROCEDURE — 1126F PR PAIN SEVERITY QUANTIFIED, NO PAIN PRESENT: ICD-10-PCS | Mod: CPTII,S$GLB,, | Performed by: INTERNAL MEDICINE

## 2023-10-11 PROCEDURE — 99204 PR OFFICE/OUTPT VISIT, NEW, LEVL IV, 45-59 MIN: ICD-10-PCS | Mod: S$GLB,,, | Performed by: INTERNAL MEDICINE

## 2023-10-11 PROCEDURE — 3288F FALL RISK ASSESSMENT DOCD: CPT | Mod: CPTII,S$GLB,, | Performed by: INTERNAL MEDICINE

## 2023-10-11 PROCEDURE — 3044F HG A1C LEVEL LT 7.0%: CPT | Mod: CPTII,S$GLB,, | Performed by: INTERNAL MEDICINE

## 2023-10-11 PROCEDURE — 3288F PR FALLS RISK ASSESSMENT DOCUMENTED: ICD-10-PCS | Mod: CPTII,S$GLB,, | Performed by: INTERNAL MEDICINE

## 2023-10-11 RX ORDER — LOSARTAN POTASSIUM 50 MG/1
50 TABLET ORAL DAILY
Qty: 90 TABLET | Refills: 4 | Status: SHIPPED | OUTPATIENT
Start: 2023-10-11 | End: 2024-03-06

## 2023-10-11 NOTE — PROGRESS NOTES
Subjective:   10/11/2023     Patient ID:  Marian Hardy is a 73 y.o. female who presents for evaulation of abnormal echo and Hypertension      Patient here for cardiac evaluation.  She does have hypertension, her blood pressures have been mildly elevated recently, 123/90, 137/80.  She takes losartan 25 mg daily.    She does not have a history of chest pains or tightness, PND or orthopnea.  Family history is negative for premature atherosclerosis doses.      Echocardiography was performed, she has a history of mitral valve prolapse, that was not evident on this study, but she did have notation made of an atrial septal aneurysm.  No PFO was noted to be present, review does not suggest 1.  The patient has not had a history of TIA or stroke.            Past Medical History:   Diagnosis Date    Allergy     Colon polyps     Osteoporosis        Review of patient's allergies indicates:   Allergen Reactions    Ace inhibitors      cough         Current Outpatient Medications:     albuterol (PROAIR HFA) 90 mcg/actuation inhaler, Inhale 2 puffs into the lungs every 6 (six) hours as needed for Wheezing or Shortness of Breath. Rescue, Disp: 18 g, Rfl: 11    azelastine (ASTELIN) 137 mcg (0.1 %) nasal spray, 2 sprays (274 mcg total) by Nasal route 2 (two) times daily., Disp: 30 mL, Rfl: 11    calcium carbonate (OS-DA) 600 mg calcium (1,500 mg) Tab, Take 600 mg by mouth once daily., Disp: , Rfl:     fluticasone furoate-vilanteroL (BREO ELLIPTA) 100-25 mcg/dose diskus inhaler, Inhale 1 puff into the lungs once daily. Controller, Disp: 90 each, Rfl: 3    fluticasone propionate (FLONASE) 50 mcg/actuation nasal spray, 2 sprays (100 mcg total) by Each Nostril route once daily., Disp: 16 g, Rfl: 11    risedronate (ACTONEL) 150 MG Tab, Take 1 tablet (150 mg total) by mouth every 30 days., Disp: 3 tablet, Rfl: 3    losartan (COZAAR) 50 MG tablet, Take 1 tablet (50 mg total) by mouth once daily., Disp: 90 tablet, Rfl: 4     Objective:    Review of Systems   Cardiovascular:  Negative for chest pain, claudication, cyanosis, dyspnea on exertion, irregular heartbeat, leg swelling, near-syncope, orthopnea, palpitations, paroxysmal nocturnal dyspnea and syncope.         Vitals:    10/11/23 1337   BP: (!) 168/91   Pulse: 64     Wt Readings from Last 3 Encounters:   10/11/23 50.4 kg (111 lb 1.8 oz)   09/15/23 50.8 kg (112 lb)   09/05/23 51.2 kg (112 lb 14 oz)     Temp Readings from Last 3 Encounters:   08/29/22 98.1 °F (36.7 °C) (Oral)   06/25/21 98.3 °F (36.8 °C) (Oral)   06/01/20 97.6 °F (36.4 °C) (Oral)     BP Readings from Last 3 Encounters:   10/11/23 (!) 168/91   09/20/23 137/82   09/15/23 130/80     Pulse Readings from Last 3 Encounters:   10/11/23 64   09/15/23 75   09/05/23 63             Physical Exam  Vitals reviewed.   Constitutional:       General: She is not in acute distress.     Appearance: She is well-developed.   HENT:      Head: Normocephalic and atraumatic.      Nose: Nose normal.   Eyes:      Conjunctiva/sclera: Conjunctivae normal.      Pupils: Pupils are equal, round, and reactive to light.   Neck:      Vascular: No carotid bruit or JVD.   Cardiovascular:      Rate and Rhythm: Normal rate and regular rhythm.      Pulses: Normal pulses and intact distal pulses.      Heart sounds: Normal heart sounds. No murmur heard.     No friction rub. No gallop.   Pulmonary:      Effort: Pulmonary effort is normal. No respiratory distress.      Breath sounds: Normal breath sounds. No wheezing or rales.   Chest:      Chest wall: No tenderness.   Abdominal:      General: Bowel sounds are normal. There is no distension.      Palpations: Abdomen is soft.      Tenderness: There is no abdominal tenderness.   Musculoskeletal:         General: No tenderness or deformity. Normal range of motion.      Cervical back: Normal range of motion and neck supple.      Right lower leg: No edema.      Left lower leg: No edema.   Skin:     General: Skin is warm and  dry.      Findings: No erythema or rash.   Neurological:      Mental Status: She is alert and oriented to person, place, and time.      Cranial Nerves: No cranial nerve deficit.      Motor: No abnormal muscle tone.      Coordination: Coordination normal.   Psychiatric:         Behavior: Behavior normal.         Thought Content: Thought content normal.         Judgment: Judgment normal.           Lab Results   Component Value Date    CHOL 197 09/01/2023    CHOL 205 (H) 09/12/2022    CHOL 201 (H) 06/30/2021     Lab Results   Component Value Date    HDL 78 (H) 09/01/2023    HDL 85 (H) 09/12/2022    HDL 84 (H) 06/30/2021     Lab Results   Component Value Date    LDLCALC 106.2 09/01/2023    LDLCALC 110 09/12/2022    LDLCALC 108 06/30/2021     Lab Results   Component Value Date    ALT 14 09/01/2023    AST 18 09/01/2023    AST 15 09/12/2022    AST 18 06/30/2021     Lab Results   Component Value Date    CREATININE 0.9 09/01/2023    BUN 13 09/01/2023     09/01/2023    K 4.1 09/01/2023    CO2 24 09/01/2023    CO2 25 09/12/2022    CO2 27 06/30/2021     Lab Results   Component Value Date    HGB 13.5 09/01/2023    HCT 42.3 09/01/2023    HCT 39.5 09/12/2022    HCT 39.6 06/30/2021                         Assessment and Plan:     Abnormal echocardiogram  Comments:  Atrial septal aneurysm not of significance in the absence of prior TIA or CVA  Orders:  -     Ambulatory referral/consult to Cardiology    Essential hypertension  Comments:  Blood pressures do appear to be elevated, will increase losartan to 50 mg daily and she can follow-up with PCP.  Orders:  -     losartan (COZAAR) 50 MG tablet; Take 1 tablet (50 mg total) by mouth once daily.  Dispense: 90 tablet; Refill: 4       Please return to clinic as needed.  No follow-ups on file.          Future Appointments   Date Time Provider Department Center   3/5/2024 10:30 AM Shantal Beatty MD [facility]   10/16/2024  8:40 AM [facility] [id number] [facility]

## 2023-10-12 ENCOUNTER — TELEPHONE (OUTPATIENT)
Dept: INTERNAL MEDICINE | Facility: CLINIC | Age: 73
End: 2023-10-12
Payer: MEDICARE

## 2023-10-12 NOTE — TELEPHONE ENCOUNTER
Spoke with pt and scheduled 4-6wk f/u per pt cardiologist to f/u obn his rec'd increase of Losartan

## 2023-11-06 ENCOUNTER — TELEPHONE (OUTPATIENT)
Dept: ADMINISTRATIVE | Facility: HOSPITAL | Age: 73
End: 2023-11-06
Payer: MEDICARE

## 2023-11-06 ENCOUNTER — PATIENT MESSAGE (OUTPATIENT)
Dept: ADMINISTRATIVE | Facility: HOSPITAL | Age: 73
End: 2023-11-06
Payer: MEDICARE

## 2023-11-06 VITALS — DIASTOLIC BLOOD PRESSURE: 86 MMHG | SYSTOLIC BLOOD PRESSURE: 125 MMHG

## 2023-11-21 ENCOUNTER — OFFICE VISIT (OUTPATIENT)
Dept: INTERNAL MEDICINE | Facility: CLINIC | Age: 73
End: 2023-11-21
Payer: MEDICARE

## 2023-11-21 VITALS
SYSTOLIC BLOOD PRESSURE: 140 MMHG | BODY MASS INDEX: 21.37 KG/M2 | DIASTOLIC BLOOD PRESSURE: 90 MMHG | HEART RATE: 68 BPM | WEIGHT: 113.13 LBS | OXYGEN SATURATION: 98 %

## 2023-11-21 DIAGNOSIS — Z00.00 HEALTH MAINTENANCE EXAMINATION: Primary | ICD-10-CM

## 2023-11-21 DIAGNOSIS — Z12.31 SCREENING MAMMOGRAM FOR BREAST CANCER: ICD-10-CM

## 2023-11-21 DIAGNOSIS — Z23 NEED FOR VACCINATION: ICD-10-CM

## 2023-11-21 DIAGNOSIS — M81.0 AGE-RELATED OSTEOPOROSIS WITHOUT CURRENT PATHOLOGICAL FRACTURE: ICD-10-CM

## 2023-11-21 DIAGNOSIS — I10 PRIMARY HYPERTENSION: ICD-10-CM

## 2023-11-21 DIAGNOSIS — Z91.89 FRAMINGHAM CARDIAC RISK 10-20% IN NEXT 10 YEARS: ICD-10-CM

## 2023-11-21 DIAGNOSIS — E55.9 VITAMIN D DEFICIENCY: ICD-10-CM

## 2023-11-21 DIAGNOSIS — J45.20 MILD INTERMITTENT EXTRINSIC ASTHMA WITHOUT COMPLICATION: ICD-10-CM

## 2023-11-21 PROCEDURE — 3080F PR MOST RECENT DIASTOLIC BLOOD PRESSURE >= 90 MM HG: ICD-10-PCS | Mod: HCNC,CPTII,S$GLB, | Performed by: STUDENT IN AN ORGANIZED HEALTH CARE EDUCATION/TRAINING PROGRAM

## 2023-11-21 PROCEDURE — 3288F FALL RISK ASSESSMENT DOCD: CPT | Mod: HCNC,CPTII,S$GLB, | Performed by: STUDENT IN AN ORGANIZED HEALTH CARE EDUCATION/TRAINING PROGRAM

## 2023-11-21 PROCEDURE — 1101F PT FALLS ASSESS-DOCD LE1/YR: CPT | Mod: HCNC,CPTII,S$GLB, | Performed by: STUDENT IN AN ORGANIZED HEALTH CARE EDUCATION/TRAINING PROGRAM

## 2023-11-21 PROCEDURE — 99213 PR OFFICE/OUTPT VISIT, EST, LEVL III, 20-29 MIN: ICD-10-PCS | Mod: HCNC,S$GLB,, | Performed by: STUDENT IN AN ORGANIZED HEALTH CARE EDUCATION/TRAINING PROGRAM

## 2023-11-21 PROCEDURE — 1159F MED LIST DOCD IN RCRD: CPT | Mod: HCNC,CPTII,S$GLB, | Performed by: STUDENT IN AN ORGANIZED HEALTH CARE EDUCATION/TRAINING PROGRAM

## 2023-11-21 PROCEDURE — 1159F PR MEDICATION LIST DOCUMENTED IN MEDICAL RECORD: ICD-10-PCS | Mod: HCNC,CPTII,S$GLB, | Performed by: STUDENT IN AN ORGANIZED HEALTH CARE EDUCATION/TRAINING PROGRAM

## 2023-11-21 PROCEDURE — 3077F SYST BP >= 140 MM HG: CPT | Mod: HCNC,CPTII,S$GLB, | Performed by: STUDENT IN AN ORGANIZED HEALTH CARE EDUCATION/TRAINING PROGRAM

## 2023-11-21 PROCEDURE — 99213 OFFICE O/P EST LOW 20 MIN: CPT | Mod: HCNC,S$GLB,, | Performed by: STUDENT IN AN ORGANIZED HEALTH CARE EDUCATION/TRAINING PROGRAM

## 2023-11-21 PROCEDURE — 3080F DIAST BP >= 90 MM HG: CPT | Mod: HCNC,CPTII,S$GLB, | Performed by: STUDENT IN AN ORGANIZED HEALTH CARE EDUCATION/TRAINING PROGRAM

## 2023-11-21 PROCEDURE — 4010F PR ACE/ARB THEARPY RXD/TAKEN: ICD-10-PCS | Mod: HCNC,CPTII,S$GLB, | Performed by: STUDENT IN AN ORGANIZED HEALTH CARE EDUCATION/TRAINING PROGRAM

## 2023-11-21 PROCEDURE — 4010F ACE/ARB THERAPY RXD/TAKEN: CPT | Mod: HCNC,CPTII,S$GLB, | Performed by: STUDENT IN AN ORGANIZED HEALTH CARE EDUCATION/TRAINING PROGRAM

## 2023-11-21 PROCEDURE — 3044F PR MOST RECENT HEMOGLOBIN A1C LEVEL <7.0%: ICD-10-PCS | Mod: HCNC,CPTII,S$GLB, | Performed by: STUDENT IN AN ORGANIZED HEALTH CARE EDUCATION/TRAINING PROGRAM

## 2023-11-21 PROCEDURE — 99999 PR PBB SHADOW E&M-EST. PATIENT-LVL III: ICD-10-PCS | Mod: PBBFAC,HCNC,, | Performed by: STUDENT IN AN ORGANIZED HEALTH CARE EDUCATION/TRAINING PROGRAM

## 2023-11-21 PROCEDURE — 99999 PR PBB SHADOW E&M-EST. PATIENT-LVL III: CPT | Mod: PBBFAC,HCNC,, | Performed by: STUDENT IN AN ORGANIZED HEALTH CARE EDUCATION/TRAINING PROGRAM

## 2023-11-21 PROCEDURE — 1101F PR PT FALLS ASSESS DOC 0-1 FALLS W/OUT INJ PAST YR: ICD-10-PCS | Mod: HCNC,CPTII,S$GLB, | Performed by: STUDENT IN AN ORGANIZED HEALTH CARE EDUCATION/TRAINING PROGRAM

## 2023-11-21 PROCEDURE — 3288F PR FALLS RISK ASSESSMENT DOCUMENTED: ICD-10-PCS | Mod: HCNC,CPTII,S$GLB, | Performed by: STUDENT IN AN ORGANIZED HEALTH CARE EDUCATION/TRAINING PROGRAM

## 2023-11-21 PROCEDURE — 3077F PR MOST RECENT SYSTOLIC BLOOD PRESSURE >= 140 MM HG: ICD-10-PCS | Mod: HCNC,CPTII,S$GLB, | Performed by: STUDENT IN AN ORGANIZED HEALTH CARE EDUCATION/TRAINING PROGRAM

## 2023-11-21 PROCEDURE — 3044F HG A1C LEVEL LT 7.0%: CPT | Mod: HCNC,CPTII,S$GLB, | Performed by: STUDENT IN AN ORGANIZED HEALTH CARE EDUCATION/TRAINING PROGRAM

## 2023-11-21 RX ORDER — VALSARTAN 160 MG/1
160 TABLET ORAL DAILY
Qty: 90 TABLET | Refills: 3 | Status: SHIPPED | OUTPATIENT
Start: 2023-11-21 | End: 2024-11-20

## 2023-11-21 NOTE — PROGRESS NOTES
"Subjective:       Patient ID: Marian Hardy is a 73 y.o. female.    Chief Complaint: Health maintenance examination [Z00.00]    Patient is established with me, here today for the following:    HTN, asthma, osteoporosis, vitamin D deficiency, environmental allergies    Health maintenance -   Cologuard SEP2022 negative, repeat in 3 years.  Denies family history of colorectal cancer.   Mammogram BI-RADS 1 in AUG2022. Due for repeat.  Denies history of prior abnormal mammogram.  Family history of breast cancers.  Denies family history of ovarian cancers.   Denies history of abnormal pap smear.  Family history of cardiac disease.  UTD on COVID, PPSV23, PCV13, shingles, influenza vaccinations.  Due for RSV, Tdap vaccinations.  Started at age 12, at most 1 PPD. Stopped smoking at age 22.  Drinks alcohol 5-6 times weekly, 1-2 drinks per sitting.  Denies drug use.  Completed hepatitis C screening.  UTD on diabetes screening.  Lab Results       Component                Value               Date                       HGBA1C                   5.3                 09/01/2023               Osteoporosis -   Vitamin D deficiency -   Completed DEXA in OCT2022.  Showed osteoporosis both hips.  "Major Osteoporotic Fracture 16.4%. Hip Fracture 5.9%."  Family history of osteoporosis.   Currently taking risedronate.   Started medication in NOV2022.  Taking vitamin D3 2,000 IU supplementation daily  Taking calcium supplementation daily    HTN -   Currently prescribed losartan.  Increased to 50 mg per cardiologist recommendations  Patient endorses taking medication as directed.  Denies side effects or concerns while taking medication.  Patient's home BP log shows -155 and DBP 76-95.  Due for micro albumin creatinine ratio.   BP Readings from Last 5 Encounters:  11/06/23 : 125/86  10/11/23 : (!) 168/91  09/20/23 : 137/82  09/15/23 : 130/80  09/13/23 : 129/80     Elevated ASCVD risk score -   Not currently taking statin " "medication.  Lab Results       Component                Value               Date                       CHOL                     197                 09/01/2023            Lab Results       Component                Value               Date                       TRIG                     64                  09/01/2023            Lab Results       Component                Value               Date                       LDLCALC                  106.2               09/01/2023            Lab Results       Component                Value               Date                       HDL                      78 (H)              09/01/2023              Asthma -   Only requiring albuterol for asthma flares.  Asthma flares are typically triggered by seasonal changes, allergies, or illness.  Uses Breo for daily controller inhaler PRN flares.   Never wakes up at night with symptoms.   No hospitalizations or office visits for asthma within the past year.  Currently following with Dr. Cid for allergy.       Echo SEP2023 EF 55-60%, "there is an aneurysm along the interatrial septum."  Followed with Dr. Pretty for cardiology  "Atrial septal aneurysm not of significance in the absence of prior TIA or CVA"  Palpitations resolved         Review of Systems   Constitutional:  Negative for appetite change, chills, fatigue, fever and unexpected weight change.   Respiratory:  Negative for cough and shortness of breath.    Cardiovascular:  Negative for chest pain, palpitations and leg swelling.   Gastrointestinal:  Negative for abdominal pain, constipation, diarrhea, nausea and vomiting.   Skin:  Negative for rash.   Neurological:  Negative for dizziness, syncope, weakness and headaches.         Current Outpatient Medications   Medication Instructions    albuterol (PROAIR HFA) 90 mcg/actuation inhaler 2 puffs, Inhalation, Every 6 hours PRN, Rescue    azelastine (ASTELIN) 274 mcg, Nasal, 2 times daily    calcium carbonate (OS-DA) 600 mg, " Oral, Daily    fluticasone furoate-vilanteroL (BREO ELLIPTA) 100-25 mcg/dose diskus inhaler 1 puff, Inhalation, Daily, Controller    fluticasone propionate (FLONASE) 100 mcg, Each Nostril, Daily    losartan (COZAAR) 50 mg, Oral, Daily    risedronate (ACTONEL) 150 mg, Oral, Every 30 days     Objective:      Vitals:    11/21/23 1113   BP: (!) 140/90   Pulse: 68   SpO2: 98%   Weight: 51.3 kg (113 lb 1.5 oz)     Body mass index is 21.37 kg/m².    Physical Exam  Vitals reviewed.   Constitutional:       General: She is not in acute distress.     Appearance: She is not ill-appearing or diaphoretic.   Cardiovascular:      Rate and Rhythm: Normal rate and regular rhythm.      Heart sounds: Normal heart sounds. No murmur heard.     No friction rub. No gallop.   Pulmonary:      Effort: Pulmonary effort is normal. No respiratory distress.      Breath sounds: Normal breath sounds. No stridor. No wheezing, rhonchi or rales.   Skin:     General: Skin is warm and dry.      Comments: Color WNL   Neurological:      Mental Status: She is alert. Mental status is at baseline.   Psychiatric:         Mood and Affect: Mood normal.         Behavior: Behavior normal.         Assessment:       1. Health maintenance examination    2. Primary hypertension    3. Age-related osteoporosis without current pathological fracture    4. Vitamin D deficiency    5. [place] cardiac risk 10-20% in next 10 years    6. Mild intermittent extrinsic asthma without complication    7. Need for vaccination    8. Screening mammogram for breast cancer        Plan:       Primary hypertension  Discontinue losartan  Start valsartan  RTC in 6 months for follow up.  -     valsartan (DIOVAN) 160 MG tablet; Take 1 tablet (160 mg total) by mouth once daily.    Age-related osteoporosis without current pathological fracture  Vitamin D deficiency  Continue current medications.  RTC in 6 months for follow up.    [place] cardiac risk 10-20% in next 10 years  Discussed now  >75 without known ASCVD or DM, no indication for statin initiation   Patient agreeable with plan    Mild intermittent extrinsic asthma without complication  Continue current medications.  Continue medication, evaluation, and management per allergist.  RTC in 6 months for follow up.    Health maintenance examination  Reviewed and discussed age appropriate screenings and immunizations.    Need for vaccination  Discussed recommended vaccinations and how to obtain.    Screening mammogram for breast cancer  -     Mammo Digital Screening Bilat; Future        Shantal Beatty MD  11/21/2023

## 2023-11-30 ENCOUNTER — TELEPHONE (OUTPATIENT)
Dept: ADMINISTRATIVE | Facility: HOSPITAL | Age: 73
End: 2023-11-30
Payer: MEDICARE

## 2023-11-30 ENCOUNTER — PATIENT MESSAGE (OUTPATIENT)
Dept: ADMINISTRATIVE | Facility: HOSPITAL | Age: 73
End: 2023-11-30
Payer: MEDICARE

## 2023-11-30 ENCOUNTER — PATIENT OUTREACH (OUTPATIENT)
Dept: ADMINISTRATIVE | Facility: HOSPITAL | Age: 73
End: 2023-11-30
Payer: MEDICARE

## 2023-11-30 VITALS — SYSTOLIC BLOOD PRESSURE: 115 MMHG | DIASTOLIC BLOOD PRESSURE: 79 MMHG

## 2024-01-23 ENCOUNTER — HOSPITAL ENCOUNTER (OUTPATIENT)
Dept: RADIOLOGY | Facility: HOSPITAL | Age: 74
Discharge: HOME | End: 2024-01-23
Attending: STUDENT IN AN ORGANIZED HEALTH CARE EDUCATION/TRAINING PROGRAM
Payer: MEDICARE

## 2024-01-23 DIAGNOSIS — Z12.31 SCREENING MAMMOGRAM FOR BREAST CANCER: ICD-10-CM

## 2024-01-23 PROCEDURE — 77063 BREAST TOMOSYNTHESIS BI: CPT | Mod: 26,HCNC,, | Performed by: RADIOLOGY

## 2024-01-23 PROCEDURE — 77067 SCR MAMMO BI INCL CAD: CPT | Mod: TC,HCNC,PO

## 2024-01-23 PROCEDURE — 77067 SCR MAMMO BI INCL CAD: CPT | Mod: 26,HCNC,, | Performed by: RADIOLOGY

## 2024-03-05 ENCOUNTER — OFFICE VISIT (OUTPATIENT)
Dept: INTERNAL MEDICINE | Facility: CLINIC | Age: 74
End: 2024-03-05
Payer: MEDICARE

## 2024-03-05 VITALS
SYSTOLIC BLOOD PRESSURE: 128 MMHG | WEIGHT: 112.44 LBS | BODY MASS INDEX: 21.24 KG/M2 | HEART RATE: 60 BPM | DIASTOLIC BLOOD PRESSURE: 80 MMHG | OXYGEN SATURATION: 98 %

## 2024-03-05 DIAGNOSIS — R73.09 OTHER ABNORMAL GLUCOSE: ICD-10-CM

## 2024-03-05 DIAGNOSIS — M81.0 AGE-RELATED OSTEOPOROSIS WITHOUT CURRENT PATHOLOGICAL FRACTURE: ICD-10-CM

## 2024-03-05 DIAGNOSIS — I10 PRIMARY HYPERTENSION: ICD-10-CM

## 2024-03-05 DIAGNOSIS — Z00.00 HEALTH MAINTENANCE EXAMINATION: Primary | ICD-10-CM

## 2024-03-05 DIAGNOSIS — Z13.6 SCREENING FOR CARDIOVASCULAR CONDITION: ICD-10-CM

## 2024-03-05 DIAGNOSIS — Z23 NEED FOR VACCINATION: ICD-10-CM

## 2024-03-05 DIAGNOSIS — J45.30 MILD PERSISTENT EXTRINSIC ASTHMA WITHOUT COMPLICATION: ICD-10-CM

## 2024-03-05 DIAGNOSIS — E55.9 VITAMIN D DEFICIENCY: ICD-10-CM

## 2024-03-05 PROCEDURE — 3288F FALL RISK ASSESSMENT DOCD: CPT | Mod: CPTII,S$GLB,, | Performed by: STUDENT IN AN ORGANIZED HEALTH CARE EDUCATION/TRAINING PROGRAM

## 2024-03-05 PROCEDURE — 1126F AMNT PAIN NOTED NONE PRSNT: CPT | Mod: CPTII,S$GLB,, | Performed by: STUDENT IN AN ORGANIZED HEALTH CARE EDUCATION/TRAINING PROGRAM

## 2024-03-05 PROCEDURE — 3074F SYST BP LT 130 MM HG: CPT | Mod: CPTII,S$GLB,, | Performed by: STUDENT IN AN ORGANIZED HEALTH CARE EDUCATION/TRAINING PROGRAM

## 2024-03-05 PROCEDURE — 3079F DIAST BP 80-89 MM HG: CPT | Mod: CPTII,S$GLB,, | Performed by: STUDENT IN AN ORGANIZED HEALTH CARE EDUCATION/TRAINING PROGRAM

## 2024-03-05 PROCEDURE — 1159F MED LIST DOCD IN RCRD: CPT | Mod: CPTII,S$GLB,, | Performed by: STUDENT IN AN ORGANIZED HEALTH CARE EDUCATION/TRAINING PROGRAM

## 2024-03-05 PROCEDURE — G2211 COMPLEX E/M VISIT ADD ON: HCPCS | Mod: S$GLB,,, | Performed by: STUDENT IN AN ORGANIZED HEALTH CARE EDUCATION/TRAINING PROGRAM

## 2024-03-05 PROCEDURE — 1101F PT FALLS ASSESS-DOCD LE1/YR: CPT | Mod: CPTII,S$GLB,, | Performed by: STUDENT IN AN ORGANIZED HEALTH CARE EDUCATION/TRAINING PROGRAM

## 2024-03-05 PROCEDURE — 99999 PR PBB SHADOW E&M-EST. PATIENT-LVL IV: CPT | Mod: PBBFAC,,, | Performed by: STUDENT IN AN ORGANIZED HEALTH CARE EDUCATION/TRAINING PROGRAM

## 2024-03-05 PROCEDURE — 99214 OFFICE O/P EST MOD 30 MIN: CPT | Mod: S$GLB,,, | Performed by: STUDENT IN AN ORGANIZED HEALTH CARE EDUCATION/TRAINING PROGRAM

## 2024-03-05 NOTE — PROGRESS NOTES
"Subjective:       Patient ID: Marian Hardy is a 73 y.o. female.    Chief Complaint: Health maintenance examination [Z00.00]    Patient is established with me, here today for the following:     HTN, asthma, osteoporosis, vitamin D deficiency, environmental allergies     Health maintenance -   Cologuard SEP2022 negative, repeat in 3 years.  Denies family history of colorectal cancer.   Mammogram BI-RADS 1 in JAN2024.  Denies history of prior abnormal mammogram.  Family history of breast cancers.  Denies family history of ovarian cancers.   Denies history of abnormal pap smear.  Family history of cardiac disease.  UTD on COVID, PPSV23, PCV13, shingles, influenza, RSV vaccinations.  Due for Tdap vaccinations.  Started at age 12, at most 1 PPD. Stopped smoking at age 22.  Drinks alcohol 5-6 times weekly, 1-2 drinks per sitting.  Denies drug use.  Completed hepatitis C screening.  UTD on diabetes screening.  Lab Results       Component                Value               Date                       HGBA1C                   5.3                 09/01/2023               Osteoporosis -   Vitamin D deficiency -   Completed DEXA in OCT2022.  Showed osteoporosis both hips.  "Major Osteoporotic Fracture 16.4%. Hip Fracture 5.9%."  Family history of osteoporosis.   Currently taking risedronate. Started medication in NOV2022.  Taking vitamin D3 2,000 IU supplementation daily  Taking calcium supplementation daily     HTN -   Currently prescribed valsartan.  Changed from losartan.  Patient endorses taking medication as directed.  Denies side effects or concerns while taking medication.  Patient's home BP log shows SBP  and DBP 72-87.  4 blood pressures -150's and DBP 90's. Repeat BP's improved  Due for micro albumin creatinine ratio.   BP Readings from Last 5 Encounters:  11/30/23 : 115/79  11/21/23 : (!) 140/90  11/06/23 : 125/86  10/11/23 : (!) 168/91  09/20/23 : 137/82    Asthma -   Only requiring albuterol for " asthma flares.  Asthma flares are typically triggered by seasonal changes, allergies, or illness.  Uses Breo for daily controller inhaler PRN flares.   Never wakes up at night with symptoms.   No hospitalizations or office visits for asthma within the past year.  Following with Dr. Cid for allergy.           Review of Systems   Constitutional:  Negative for activity change, fatigue and fever.   Respiratory:  Negative for cough and shortness of breath.    Cardiovascular:  Negative for chest pain and palpitations.   Neurological:  Negative for syncope and headaches.         Current Outpatient Medications   Medication Instructions    albuterol (PROAIR HFA) 90 mcg/actuation inhaler 2 puffs, Inhalation, Every 6 hours PRN, Rescue    azelastine (ASTELIN) 274 mcg, Nasal, 2 times daily    calcium carbonate (OS-DA) 600 mg, Oral, Daily    fluticasone furoate-vilanteroL (BREO ELLIPTA) 100-25 mcg/dose diskus inhaler 1 puff, Inhalation, Daily, Controller    fluticasone propionate (FLONASE) 100 mcg, Each Nostril, Daily    losartan (COZAAR) 50 mg, Oral, Daily    risedronate (ACTONEL) 150 mg, Oral, Every 30 days    valsartan (DIOVAN) 160 mg, Oral, Daily     Objective:      Vitals:    03/05/24 1042 03/05/24 1109   BP: (!) 150/86 128/80   Pulse: 60    SpO2: 98%    Weight: 51 kg (112 lb 7 oz)    PainSc: 0-No pain      Body mass index is 21.24 kg/m².    Physical Exam  Vitals reviewed.   Constitutional:       General: She is not in acute distress.     Appearance: Normal appearance. She is not ill-appearing or diaphoretic.   HENT:      Head: Normocephalic and atraumatic.      Right Ear: Tympanic membrane, ear canal and external ear normal. There is no impacted cerumen.      Left Ear: Tympanic membrane, ear canal and external ear normal. There is no impacted cerumen.      Nose: Nose normal. No rhinorrhea.      Mouth/Throat:      Mouth: Mucous membranes are moist.      Pharynx: Oropharynx is clear. No oropharyngeal exudate or  posterior oropharyngeal erythema.   Eyes:      General: No scleral icterus.        Right eye: No discharge.         Left eye: No discharge.      Conjunctiva/sclera: Conjunctivae normal.   Neck:      Thyroid: No thyromegaly or thyroid tenderness.      Trachea: Trachea normal.   Cardiovascular:      Rate and Rhythm: Normal rate and regular rhythm.      Heart sounds: Normal heart sounds. No murmur heard.     No friction rub. No gallop.   Pulmonary:      Effort: Pulmonary effort is normal. No respiratory distress.      Breath sounds: Normal breath sounds. No stridor. No wheezing, rhonchi or rales.   Abdominal:      General: There is no distension.      Palpations: Abdomen is soft.      Tenderness: There is no abdominal tenderness. There is no guarding or rebound.   Musculoskeletal:         General: No swelling or deformity.      Cervical back: Neck supple.   Lymphadenopathy:      Head:      Right side of head: No submandibular or posterior auricular adenopathy.      Left side of head: No submandibular or posterior auricular adenopathy.      Cervical: No cervical adenopathy.      Right cervical: No superficial, deep or posterior cervical adenopathy.     Left cervical: No superficial, deep or posterior cervical adenopathy.      Upper Body:      Right upper body: No supraclavicular adenopathy.      Left upper body: No supraclavicular adenopathy.   Skin:     General: Skin is warm and dry.   Neurological:      General: No focal deficit present.      Mental Status: She is alert. Mental status is at baseline.      Gait: Gait normal.   Psychiatric:         Mood and Affect: Mood normal.         Behavior: Behavior normal.         Assessment:       1. Health maintenance examination    2. Age-related osteoporosis without current pathological fracture    3. Vitamin D deficiency    4. Primary hypertension    5. Mild persistent extrinsic asthma without complication    6. Need for vaccination    7. Other abnormal glucose    8.  Screening for cardiovascular condition        Plan:       Age-related osteoporosis without current pathological fracture  Vitamin D deficiency  Continue current medications.  Continue supplementation.  RTC in 6 months for follow up.  -     Vitamin D; Future    Primary hypertension  Continue current medications.  Check BP at home 3-4 times weekly, keep log for review.   RTC in 6 months for follow up.  -     Comprehensive Metabolic Panel; Future  -     TSH; Future  -     CBC Auto Differential; Future  -     Microalbumin/Creatinine Ratio, Urine; Future    Mild persistent extrinsic asthma without complication  Continue current medications.  RTC in 6 months for follow up.    Health maintenance examination  Reviewed and discussed age appropriate screenings and immunizations.  -     Comprehensive Metabolic Panel; Future  -     TSH; Future  -     Lipid Panel; Future  -     Hemoglobin A1C; Future  -     CBC Auto Differential; Future  -     Vitamin D; Future  -     Microalbumin/Creatinine Ratio, Urine; Future    Need for vaccination  Discussed recommended vaccinations and how to obtain.    Other abnormal glucose  -     Hemoglobin A1C; Future    Screening for cardiovascular condition  -     Lipid Panel; Future        Shantal Beatty MD  3/5/2024

## 2024-03-06 ENCOUNTER — PATIENT MESSAGE (OUTPATIENT)
Dept: INTERNAL MEDICINE | Facility: CLINIC | Age: 74
End: 2024-03-06
Payer: MEDICARE

## 2024-04-22 DIAGNOSIS — M81.0 AGE-RELATED OSTEOPOROSIS WITHOUT CURRENT PATHOLOGICAL FRACTURE: ICD-10-CM

## 2024-04-22 RX ORDER — RISEDRONATE SODIUM 150 MG/1
150 TABLET, FILM COATED ORAL
Qty: 3 TABLET | Refills: 3 | Status: SHIPPED | OUTPATIENT
Start: 2024-04-22 | End: 2025-04-22

## 2024-04-22 RX ORDER — FLUTICASONE PROPIONATE 50 MCG
2 SPRAY, SUSPENSION (ML) NASAL DAILY
Qty: 16 G | Refills: 11 | Status: SHIPPED | OUTPATIENT
Start: 2024-04-22

## 2024-04-22 NOTE — TELEPHONE ENCOUNTER
Refill Routing Note   Medication(s) are not appropriate for processing by [facility] for the following reason(s):        Outside of protocol    [facility] action(s):  Route               Appointments  past 12m or future 3m with PCP    Date Provider   Last Visit   3/5/2024 Shantal Beatty MD   Next Visit   9/16/2024 Shantal Beatty MD   ED visits in past 90 days: 0        Note composed:11:25 AM 04/22/2024

## 2024-08-14 ENCOUNTER — OFFICE VISIT (OUTPATIENT)
Dept: OTOLARYNGOLOGY | Facility: CLINIC | Age: 74
End: 2024-08-14
Payer: MEDICARE

## 2024-08-14 VITALS
BODY MASS INDEX: 21.14 KG/M2 | HEIGHT: 61 IN | HEART RATE: 65 BPM | WEIGHT: 112 LBS | DIASTOLIC BLOOD PRESSURE: 90 MMHG | SYSTOLIC BLOOD PRESSURE: 160 MMHG

## 2024-08-14 DIAGNOSIS — H61.23 BILATERAL IMPACTED CERUMEN: Primary | ICD-10-CM

## 2024-08-14 PROCEDURE — 1101F PT FALLS ASSESS-DOCD LE1/YR: CPT | Mod: HCNC,CPTII,S$GLB, | Performed by: PHYSICIAN ASSISTANT

## 2024-08-14 PROCEDURE — 3080F DIAST BP >= 90 MM HG: CPT | Mod: HCNC,CPTII,S$GLB, | Performed by: PHYSICIAN ASSISTANT

## 2024-08-14 PROCEDURE — 99214 OFFICE O/P EST MOD 30 MIN: CPT | Mod: 25,HCNC,S$GLB, | Performed by: PHYSICIAN ASSISTANT

## 2024-08-14 PROCEDURE — 3288F FALL RISK ASSESSMENT DOCD: CPT | Mod: HCNC,CPTII,S$GLB, | Performed by: PHYSICIAN ASSISTANT

## 2024-08-14 PROCEDURE — 1159F MED LIST DOCD IN RCRD: CPT | Mod: HCNC,CPTII,S$GLB, | Performed by: PHYSICIAN ASSISTANT

## 2024-08-14 PROCEDURE — 69210 REMOVE IMPACTED EAR WAX UNI: CPT | Mod: HCNC,S$GLB,, | Performed by: PHYSICIAN ASSISTANT

## 2024-08-14 PROCEDURE — 99999 PR PBB SHADOW E&M-EST. PATIENT-LVL III: CPT | Mod: PBBFAC,HCNC,, | Performed by: PHYSICIAN ASSISTANT

## 2024-08-14 PROCEDURE — 1160F RVW MEDS BY RX/DR IN RCRD: CPT | Mod: HCNC,CPTII,S$GLB, | Performed by: PHYSICIAN ASSISTANT

## 2024-08-14 PROCEDURE — 1126F AMNT PAIN NOTED NONE PRSNT: CPT | Mod: HCNC,CPTII,S$GLB, | Performed by: PHYSICIAN ASSISTANT

## 2024-08-14 PROCEDURE — 3077F SYST BP >= 140 MM HG: CPT | Mod: HCNC,CPTII,S$GLB, | Performed by: PHYSICIAN ASSISTANT

## 2024-08-14 NOTE — PROGRESS NOTES
"[facility]    Subjective:      Patient: Marian Hardy Patient PCP: Shantal Beatty MD         :  1950     Sex:  female      MRN:  [id number]          Date of Visit: 2024      Chief Complaint: Cerumen Impaction    Patient ID: Marian Hardy is a 73 y.o. female who presents to office for ear cleaning for cerumen impaction. Pt denies current fever/chills or ear pain/discharge. Pt denies issues with hearing loss outside of cerumen impaction.       Past Medical History  She has a past medical history of Allergy, Colon polyps, and Osteoporosis.    Family History  Her family history includes Allergies in her daughter; Alzheimer's disease in her brother; Brain cancer in her maternal grandfather; Breast cancer (age of onset: 40) in her maternal aunt; Heart failure in her brother and father; Hypertension in her mother; Osteoporosis in her mother; Stroke in her maternal aunt.    Past Surgical History:   Procedure Laterality Date     SECTION       SECTION       SECTION      WISDOM TOOTH EXTRACTION       Social History     Tobacco Use    Smoking status: Never    Smokeless tobacco: Never   Substance and Sexual Activity    Alcohol use: Yes    Drug use: No    Sexual activity: Yes     Medications  She has a current medication list which includes the following prescription(s): fluticasone propionate, valsartan, albuterol, azelastine, calcium carbonate, fluticasone furoate-vilanterol, and risedronate.    Review of patient's allergies indicates:   Allergen Reactions    Ace inhibitors      cough     All medications, allergies, and past history have been reviewed.    Objective:      Vitals:      2023    12:50 PM 3/5/2024    10:42 AM 2024     2:11 PM   Vitals - 1 value per visit   SYSTOLIC 115 150 160   DIASTOLIC 79 86 90   Pulse  60 65   SPO2  98 %    Weight (lb)  112.43 111.99   Weight (kg)  51 50.8   Height   5' 1" (1.549 m)   BMI (Calculated)   21.2   Pain Score  Zero Zero "       Body surface area is 1.48 meters squared.    Physical Exam  Constitutional:       General: She is not in acute distress.     Appearance: Normal appearance. She is not ill-appearing.   HENT:      Head: Normocephalic and atraumatic.      Right Ear: Tympanic membrane, ear canal and external ear normal. There is impacted cerumen.      Left Ear: Tympanic membrane, ear canal and external ear normal. There is impacted cerumen.      Nose: Nose normal.      Mouth/Throat:      Lips: Pink. No lesions.      Mouth: Mucous membranes are moist. No oral lesions.      Tongue: No lesions.      Palate: No lesions.      Pharynx: Oropharynx is clear. Uvula midline. No pharyngeal swelling, oropharyngeal exudate, posterior oropharyngeal erythema or uvula swelling.   Eyes:      General:         Right eye: No discharge.         Left eye: No discharge.      Extraocular Movements: Extraocular movements intact.      Conjunctiva/sclera: Conjunctivae normal.   Pulmonary:      Effort: Pulmonary effort is normal.   Neurological:      General: No focal deficit present.      Mental Status: She is alert and oriented to person, place, and time. Mental status is at baseline.   Psychiatric:         Mood and Affect: Mood normal.         Behavior: Behavior normal.         Thought Content: Thought content normal.         Judgment: Judgment normal.     Ear Cerumen Removal     Date/Time: 8/14/2024 2:00 PM     Performed by: Dominic Orellana PA-C  Authorized by: Dominic Orellana PA-C       Local anesthetic:  None  Location details:  Both ears  Procedure type comment:  Suction and curette  Cerumen  Removal Results:  Cerumen completely removed  Patient tolerance:  Patient tolerated the procedure well with no immediate complications      Labs:  WBC   Date Value Ref Range Status   09/01/2023 4.01 3.90 - 12.70 K/uL Final     Platelets   Date Value Ref Range Status   09/01/2023 143 (L) 150 - 450 K/uL Final     Creatinine   Date Value Ref Range  Status   09/01/2023 0.9 0.5 - 1.4 mg/dL Final     TSH   Date Value Ref Range Status   09/01/2023 1.794 0.400 - 4.000 uIU/mL Final     Glucose   Date Value Ref Range Status   09/01/2023 87 70 - 110 mg/dL Final     Hemoglobin A1C   Date Value Ref Range Status   09/01/2023 5.3 4.0 - 5.6 % Final     Comment:     ADA Screening Guidelines:  5.7-6.4%  Consistent with prediabetes  >or=6.5%  Consistent with diabetes    High levels of fetal hemoglobin interfere with the HbA1C  assay. Heterozygous hemoglobin variants (HbS, HgC, etc)do  not significantly interfere with this assay.   However, presence of multiple variants may affect accuracy.       All lab results and imaging results have been reviewed.    Assessment:        ICD-10-CM ICD-9-CM   1. Bilateral impacted cerumen  H61.23 380.4           Plan:      Pt had bilateral ear cleaning performed today for cerumen impaction. Pt is to proceed with annual ear cleaning and may follow up as needed for ENT issues/concerns prior to annual ear cleaning.

## 2024-08-18 NOTE — PROCEDURES
Ear Cerumen Removal    Date/Time: 8/14/2024 2:00 PM    Performed by: Dominic Orellana PA-C  Authorized by: Dominic Orellana PA-C      Local anesthetic:  None  Location details:  Both ears  Procedure type comment:  Suction and curette  Cerumen  Removal Results:  Cerumen completely removed  Patient tolerance:  Patient tolerated the procedure well with no immediate complications

## 2024-09-06 ENCOUNTER — TELEPHONE (OUTPATIENT)
Dept: INTERNAL MEDICINE | Facility: CLINIC | Age: 74
End: 2024-09-06
Payer: MEDICARE

## 2024-09-06 ENCOUNTER — PATIENT MESSAGE (OUTPATIENT)
Dept: INTERNAL MEDICINE | Facility: CLINIC | Age: 74
End: 2024-09-06
Payer: MEDICARE

## 2024-09-06 NOTE — TELEPHONE ENCOUNTER
----- Message from Radha Allen sent at 9/6/2024  9:42 AM CDT -----  Regarding: lab orders  Type:  Patient Returning Call      Name of who is calling:Pt         What is request in detail:patient is requesting a call back in regards to her lab work, she is at [facility] and needs orders uploaded to [facility] or faxed to  for upcoming visit on 09/17        Can clinic reply by [facility]:no        What number to call back if not in [facility]: [phone]

## 2024-09-06 NOTE — TELEPHONE ENCOUNTER
----- Message from Emmie Garcia sent at 9/6/2024 12:59 PM CDT -----  Contact: Eric  Type:  Patient Call          Who Called:Patient         Does the patient know what this is regarding?: Requesting a call back pt would like to have her lab orders sent to [facility]  Fax [phone]  ; pt was at [facility] earlier waiting for her orders to be faxed she would like to have a call when the orders are sent so that she can schedule a appt ; please advise            Would the patient rather a call back or a response via [facility]?call           Best Call Back Number: [phone]             Additional Information:

## 2024-09-06 NOTE — TELEPHONE ENCOUNTER
----- Message from Emmie Garcia sent at 9/6/2024 12:59 PM CDT -----  Contact: Eric  Type:  Patient Call          Who Called:Patient         Does the patient know what this is regarding?: Requesting a call back pt would like to have her lab orders sent to [facility]  Fax [phone]  ; pt was at [facility] earlier waiting for her orders to be faxed she would like to have a call when the orders are sent so that she can schedule a appt ; please advise            Would the patient rather a call back or a response via [facility]?call           Best Call Back Number: [phone]             Additional Information:

## 2024-09-17 ENCOUNTER — OFFICE VISIT (OUTPATIENT)
Dept: INTERNAL MEDICINE | Facility: CLINIC | Age: 74
End: 2024-09-17
Payer: MEDICARE

## 2024-09-17 VITALS
DIASTOLIC BLOOD PRESSURE: 68 MMHG | SYSTOLIC BLOOD PRESSURE: 118 MMHG | HEIGHT: 61 IN | WEIGHT: 112.19 LBS | BODY MASS INDEX: 21.18 KG/M2 | OXYGEN SATURATION: 98 % | HEART RATE: 62 BPM

## 2024-09-17 DIAGNOSIS — R73.03 PREDIABETES: ICD-10-CM

## 2024-09-17 DIAGNOSIS — I10 PRIMARY HYPERTENSION: ICD-10-CM

## 2024-09-17 DIAGNOSIS — E55.9 VITAMIN D DEFICIENCY: ICD-10-CM

## 2024-09-17 DIAGNOSIS — M81.0 AGE-RELATED OSTEOPOROSIS WITHOUT CURRENT PATHOLOGICAL FRACTURE: ICD-10-CM

## 2024-09-17 DIAGNOSIS — E53.8 VITAMIN B12 DEFICIENCY: ICD-10-CM

## 2024-09-17 DIAGNOSIS — J45.20 MILD INTERMITTENT EXTRINSIC ASTHMA WITHOUT COMPLICATION: ICD-10-CM

## 2024-09-17 DIAGNOSIS — Z23 NEED FOR VACCINATION: ICD-10-CM

## 2024-09-17 DIAGNOSIS — Z91.09 ENVIRONMENTAL ALLERGIES: ICD-10-CM

## 2024-09-17 DIAGNOSIS — Z00.00 HEALTH MAINTENANCE EXAMINATION: Primary | ICD-10-CM

## 2024-09-17 PROCEDURE — 99999 PR PBB SHADOW E&M-EST. PATIENT-LVL III: CPT | Mod: PBBFAC,HCNC,, | Performed by: STUDENT IN AN ORGANIZED HEALTH CARE EDUCATION/TRAINING PROGRAM

## 2024-09-17 PROCEDURE — 3044F HG A1C LEVEL LT 7.0%: CPT | Mod: HCNC,CPTII,S$GLB, | Performed by: STUDENT IN AN ORGANIZED HEALTH CARE EDUCATION/TRAINING PROGRAM

## 2024-09-17 PROCEDURE — 3074F SYST BP LT 130 MM HG: CPT | Mod: HCNC,CPTII,S$GLB, | Performed by: STUDENT IN AN ORGANIZED HEALTH CARE EDUCATION/TRAINING PROGRAM

## 2024-09-17 PROCEDURE — 1159F MED LIST DOCD IN RCRD: CPT | Mod: HCNC,CPTII,S$GLB, | Performed by: STUDENT IN AN ORGANIZED HEALTH CARE EDUCATION/TRAINING PROGRAM

## 2024-09-17 PROCEDURE — G2211 COMPLEX E/M VISIT ADD ON: HCPCS | Mod: HCNC,S$GLB,, | Performed by: STUDENT IN AN ORGANIZED HEALTH CARE EDUCATION/TRAINING PROGRAM

## 2024-09-17 PROCEDURE — 3288F FALL RISK ASSESSMENT DOCD: CPT | Mod: HCNC,CPTII,S$GLB, | Performed by: STUDENT IN AN ORGANIZED HEALTH CARE EDUCATION/TRAINING PROGRAM

## 2024-09-17 PROCEDURE — 3078F DIAST BP <80 MM HG: CPT | Mod: HCNC,CPTII,S$GLB, | Performed by: STUDENT IN AN ORGANIZED HEALTH CARE EDUCATION/TRAINING PROGRAM

## 2024-09-17 PROCEDURE — 99214 OFFICE O/P EST MOD 30 MIN: CPT | Mod: HCNC,S$GLB,, | Performed by: STUDENT IN AN ORGANIZED HEALTH CARE EDUCATION/TRAINING PROGRAM

## 2024-09-17 PROCEDURE — 1101F PT FALLS ASSESS-DOCD LE1/YR: CPT | Mod: HCNC,CPTII,S$GLB, | Performed by: STUDENT IN AN ORGANIZED HEALTH CARE EDUCATION/TRAINING PROGRAM

## 2024-09-17 RX ORDER — VALSARTAN 160 MG/1
160 TABLET ORAL DAILY
Qty: 90 TABLET | Refills: 3 | Status: SHIPPED | OUTPATIENT
Start: 2024-09-17 | End: 2025-09-17

## 2024-09-17 RX ORDER — FLUTICASONE PROPIONATE 50 MCG
2 SPRAY, SUSPENSION (ML) NASAL DAILY
Qty: 54 G | Refills: 3 | Status: SHIPPED | OUTPATIENT
Start: 2024-09-17

## 2024-09-17 RX ORDER — ALBUTEROL SULFATE 90 UG/1
2 INHALANT RESPIRATORY (INHALATION) EVERY 6 HOURS PRN
Qty: 18 G | Refills: 11 | Status: SHIPPED | OUTPATIENT
Start: 2024-09-17

## 2024-09-17 NOTE — PROGRESS NOTES
"Subjective:       Patient ID: Marian Hardy is a 73 y.o. female.    Chief Complaint: Health maintenance examination [Z00.00]    Patient is established with me, here today for the following:     HTN, prediabetes, asthma, osteoporosis, vitamin D deficiency, environmental allergies    Health maintenance -   Cologuard SEP2022 negative, repeat in 3 years.  Denies family history of colorectal cancer.   Mammogram BI-RADS 1 in JAN2024.   Denies history of prior abnormal mammogram.  Family history of breast cancers.  Denies family history of ovarian cancers.   Denies history of abnormal pap smear.  Family history of cardiac disease.  UTD on COVID, PPSV23, PCV13, shingles, RSV vaccinations.  Due for Tdap, COVID, influenza vaccinations.  Started at age 12, at most 1 PPD. Stopped smoking at age 22.  Denies drug use.  Completed hepatitis C screening.  UTD on lipid screening.  Lab Results       Component                Value               Date                       LDLCALC                  94                  09/13/2024                    Osteoporosis -   Vitamin D deficiency -   Completed DEXA in OCT2022.   Showed osteoporosis both hips.  "Major Osteoporotic Fracture 16.4%. Hip Fracture 5.9%."  Family history of osteoporosis.   Currently taking risedronate. Started medication in NOV2022.  Taking vitamin D3 2,000 IU supplementation daily  Taking calcium supplementation daily     HTN -   Currently prescribed valsartan.  Patient endorses taking medication as directed.  Denies side effects or concerns while taking medication.  Lab Results       Component                Value               Date                       MICALBCREAT              NOTE                09/13/2024            BP Readings from Last 5 Encounters:  08/14/24 : (!) 160/90  03/05/24 : 128/80  11/30/23 : 115/79  11/21/23 : (!) 140/90  11/06/23 : 125/86     Asthma -   Only requiring albuterol for asthma flares.  Asthma flares are typically triggered by seasonal " "changes, allergies, or illness.  Has not required recently   Uses Breo for daily controller inhaler PRN flares.   Never wakes up at night with symptoms.   No hospitalizations or office visits for asthma within the past year.  Previously has followed with Dr. Cid for allergy.       Prediabetes -   Endorses overall healthy diet.   Endorses exercising routinely.  UTD on diabetes screening.  Lab Results       Component                Value               Date                       HGBA1C                   5.7 (H)             09/13/2024                 HGBA1C                   5.3                 09/01/2023                  Review of Systems   Constitutional:  Negative for activity change, fatigue and fever.   Respiratory:  Negative for cough and shortness of breath.    Cardiovascular:  Negative for chest pain and palpitations.   Gastrointestinal:  Negative for abdominal pain, constipation, diarrhea, nausea and vomiting.   Neurological:  Negative for syncope and headaches.         Current Outpatient Medications   Medication Instructions    albuterol (PROAIR HFA) 90 mcg/actuation inhaler 2 puffs, Inhalation, Every 6 hours PRN, Rescue    calcium carbonate (OS-DA) 600 mg, Oral, Daily    fluticasone furoate-vilanteroL (BREO ELLIPTA) 100-25 mcg/dose diskus inhaler 1 puff, Inhalation, Daily, Controller    fluticasone propionate (FLONASE) 100 mcg, Each Nostril, Daily    risedronate (ACTONEL) 150 mg, Oral, Every 30 days    valsartan (DIOVAN) 160 mg, Oral, Daily     Objective:      Vitals:    09/17/24 0840   BP: 118/68   Pulse: 62   SpO2: 98%   Weight: 50.9 kg (112 lb 3.4 oz)   Height: 5' 1" (1.549 m)     Body mass index is 21.2 kg/m².    Physical Exam  Vitals reviewed.   Constitutional:       General: She is not in acute distress.     Appearance: Normal appearance. She is not ill-appearing or diaphoretic.   HENT:      Head: Normocephalic and atraumatic.      Right Ear: Tympanic membrane, ear canal and external ear " normal. There is no impacted cerumen.      Left Ear: Tympanic membrane, ear canal and external ear normal. There is no impacted cerumen.      Nose: Nose normal. No rhinorrhea.      Mouth/Throat:      Mouth: Mucous membranes are moist.      Pharynx: Oropharynx is clear. No oropharyngeal exudate or posterior oropharyngeal erythema.   Eyes:      General: No scleral icterus.        Right eye: No discharge.         Left eye: No discharge.      Conjunctiva/sclera: Conjunctivae normal.   Neck:      Thyroid: No thyromegaly or thyroid tenderness.      Trachea: Trachea normal.   Cardiovascular:      Rate and Rhythm: Normal rate and regular rhythm.      Heart sounds: Normal heart sounds. No murmur heard.     No friction rub. No gallop.   Pulmonary:      Effort: Pulmonary effort is normal. No respiratory distress.      Breath sounds: Normal breath sounds. No stridor. No wheezing, rhonchi or rales.   Abdominal:      General: There is no distension.      Palpations: Abdomen is soft.      Tenderness: There is no abdominal tenderness. There is no guarding or rebound.   Musculoskeletal:         General: No swelling or deformity.      Cervical back: Neck supple.   Lymphadenopathy:      Head:      Right side of head: No submandibular or posterior auricular adenopathy.      Left side of head: No submandibular or posterior auricular adenopathy.      Cervical: No cervical adenopathy.      Right cervical: No superficial, deep or posterior cervical adenopathy.     Left cervical: No superficial, deep or posterior cervical adenopathy.      Upper Body:      Right upper body: No supraclavicular adenopathy.      Left upper body: No supraclavicular adenopathy.   Skin:     General: Skin is warm and dry.   Neurological:      General: No focal deficit present.      Mental Status: She is alert. Mental status is at baseline.      Gait: Gait normal.   Psychiatric:         Mood and Affect: Mood normal.         Behavior: Behavior normal.          Assessment:       1. Health maintenance examination    2. Age-related osteoporosis without current pathological fracture    3. Vitamin D deficiency    4. Primary hypertension    5. Mild intermittent extrinsic asthma without complication    6. Prediabetes    7. Vitamin B12 deficiency    8. Environmental allergies    9. Need for vaccination        Plan:       Age-related osteoporosis without current pathological fracture  Vitamin D deficiency  Repeat DEXA scheduled  Continue current medications.  Continue supplementation.    Primary hypertension  Continue current medications.  -     COMPREHENSIVE METABOLIC PANEL; Future  -     COMPREHENSIVE METABOLIC PANEL  -     valsartan (DIOVAN) 160 MG tablet; Take 1 tablet (160 mg total) by mouth once daily.    Mild intermittent extrinsic asthma without complication  Continue current medications.  -     albuterol (PROAIR HFA) 90 mcg/actuation inhaler; Inhale 2 puffs into the lungs every 6 (six) hours as needed for Wheezing or Shortness of Breath. Rescue    Prediabetes  Continue healthy diet and lifestyle modifications    Vitamin B12 deficiency  -     Vitamin B12; Future  -     Vitamin B12    Environmental allergies  -     fluticasone propionate (FLONASE) 50 mcg/actuation nasal spray; 2 sprays (100 mcg total) by Each Nostril route once daily.    Health maintenance examination  Reviewed and discussed age appropriate screenings and immunizations.  RTC in 1 year for annual appointment, sooner PRN.  -     COMPREHENSIVE METABOLIC PANEL; Future  -     Vitamin B12; Future  -     COMPREHENSIVE METABOLIC PANEL  -     Vitamin B12    Need for vaccination  Discussed recommended vaccinations and how to obtain.      Shantal Beatty MD  9/17/2024

## 2024-10-02 ENCOUNTER — PATIENT MESSAGE (OUTPATIENT)
Dept: INTERNAL MEDICINE | Facility: CLINIC | Age: 74
End: 2024-10-02
Payer: MEDICARE

## 2024-10-15 ENCOUNTER — HOSPITAL ENCOUNTER (OUTPATIENT)
Dept: RADIOLOGY | Facility: OTHER | Age: 74
Discharge: HOME | End: 2024-10-15
Attending: STUDENT IN AN ORGANIZED HEALTH CARE EDUCATION/TRAINING PROGRAM
Payer: MEDICARE

## 2024-10-15 DIAGNOSIS — M81.0 AGE-RELATED OSTEOPOROSIS WITHOUT CURRENT PATHOLOGICAL FRACTURE: Primary | ICD-10-CM

## 2024-10-15 DIAGNOSIS — M81.0 AGE-RELATED OSTEOPOROSIS WITHOUT CURRENT PATHOLOGICAL FRACTURE: ICD-10-CM

## 2024-10-15 PROCEDURE — 77080 DXA BONE DENSITY AXIAL: CPT | Mod: TC,HCNC

## 2024-10-15 PROCEDURE — 77080 DXA BONE DENSITY AXIAL: CPT | Mod: 26,HCNC,, | Performed by: RADIOLOGY

## 2024-10-16 ENCOUNTER — TELEPHONE (OUTPATIENT)
Dept: ORTHOPEDICS | Facility: CLINIC | Age: 74
End: 2024-10-16
Payer: MEDICARE

## 2024-10-25 DIAGNOSIS — E87.1 HYPONATREMIA: Primary | ICD-10-CM

## 2024-11-04 ENCOUNTER — PATIENT OUTREACH (OUTPATIENT)
Dept: ADMINISTRATIVE | Facility: HOSPITAL | Age: 74
End: 2024-11-04
Payer: MEDICARE

## 2024-11-22 DIAGNOSIS — J45.30 MILD PERSISTENT EXTRINSIC ASTHMA WITHOUT COMPLICATION: ICD-10-CM

## 2024-11-22 RX ORDER — FLUTICASONE FUROATE AND VILANTEROL 100; 25 UG/1; UG/1
1 POWDER RESPIRATORY (INHALATION) DAILY
Qty: 90 EACH | Refills: 3 | Status: CANCELLED | OUTPATIENT
Start: 2024-11-22 | End: 2025-11-22

## 2024-11-22 RX ORDER — FLUTICASONE FUROATE AND VILANTEROL 100; 25 UG/1; UG/1
1 POWDER RESPIRATORY (INHALATION) DAILY
Qty: 90 EACH | Refills: 3 | Status: SHIPPED | OUTPATIENT
Start: 2024-11-22 | End: 2025-11-22

## 2024-11-22 NOTE — TELEPHONE ENCOUNTER
Refill Routing Note   Medication(s) are not appropriate for processing by [facility] for the following reason(s):        Due for refill >6 months ago    [facility] action(s):  Defer   Requires labs : Yes               Appointments  past 12m or future 3m with PCP    Date Provider   Last Visit   9/17/2024 Shantal Beatty MD   Next Visit   Visit date not found Shantal Beatty MD   ED visits in past 90 days: 0        Note composed:11:09 AM 11/22/2024

## 2024-11-22 NOTE — TELEPHONE ENCOUNTER
Refill Routing Note   Medication(s) are not appropriate for processing by [facility] for the following reason(s):        Due for refill >6 months ago    [facility] action(s):  Defer        Medication Therapy Plan: Last ordered 11/16/2022      Appointments  past 12m or future 3m with PCP    Date Provider   Last Visit   9/17/2024 Shantal Beatty MD   Next Visit   Visit date not found Shantal Beatty MD   ED visits in past 90 days: 0        Note composed:2:53 PM 11/22/2024

## 2024-11-22 NOTE — TELEPHONE ENCOUNTER
No care due was identified.  Health [facility] Embedded Care Due Messages. Reference number: [id number].   11/22/2024 2:32:55 PM CST

## 2024-11-22 NOTE — TELEPHONE ENCOUNTER
Care Due:                  Date            Visit Type   Department     Provider  --------------------------------------------------------------------------------                                EP -                              PRIMARY      [facility] INTERNAL  Last Visit: 09-      CARE ([facility])   MEDICINE       Shantal Beatty  Next Visit: None Scheduled  None         None Found                                                            Last  Test          Frequency    Reason                     Performed    Due Date  --------------------------------------------------------------------------------    Mg Level....  12 months..  risedronate..............  Not Found    Overdue    Phosphate...  12 months..  risedronate..............  Not Found    Overdue    [employer] Embedded Care Due Messages. Reference number: [id number].   11/22/2024 8:41:35 AM CST

## 2024-12-19 ENCOUNTER — PATIENT MESSAGE (OUTPATIENT)
Dept: INTERNAL MEDICINE | Facility: CLINIC | Age: 74
End: 2024-12-19
Payer: MEDICARE

## 2024-12-23 ENCOUNTER — OFFICE VISIT (OUTPATIENT)
Dept: INTERNAL MEDICINE | Facility: CLINIC | Age: 74
End: 2024-12-23
Payer: MEDICARE

## 2024-12-23 VITALS
DIASTOLIC BLOOD PRESSURE: 80 MMHG | SYSTOLIC BLOOD PRESSURE: 144 MMHG | HEART RATE: 65 BPM | OXYGEN SATURATION: 97 % | BODY MASS INDEX: 21.36 KG/M2 | HEIGHT: 61 IN | WEIGHT: 113.13 LBS

## 2024-12-23 DIAGNOSIS — J30.2 SEASONAL ALLERGIES: Primary | ICD-10-CM

## 2024-12-23 PROCEDURE — 3079F DIAST BP 80-89 MM HG: CPT | Mod: HCNC,CPTII,S$GLB,

## 2024-12-23 PROCEDURE — 99213 OFFICE O/P EST LOW 20 MIN: CPT | Mod: HCNC,S$GLB,,

## 2024-12-23 PROCEDURE — 1126F AMNT PAIN NOTED NONE PRSNT: CPT | Mod: HCNC,CPTII,S$GLB,

## 2024-12-23 PROCEDURE — 1101F PT FALLS ASSESS-DOCD LE1/YR: CPT | Mod: HCNC,CPTII,S$GLB,

## 2024-12-23 PROCEDURE — 3077F SYST BP >= 140 MM HG: CPT | Mod: HCNC,CPTII,S$GLB,

## 2024-12-23 PROCEDURE — 3288F FALL RISK ASSESSMENT DOCD: CPT | Mod: HCNC,CPTII,S$GLB,

## 2024-12-23 PROCEDURE — 99999 PR PBB SHADOW E&M-EST. PATIENT-LVL III: CPT | Mod: PBBFAC,HCNC,,

## 2024-12-23 PROCEDURE — 1159F MED LIST DOCD IN RCRD: CPT | Mod: HCNC,CPTII,S$GLB,

## 2024-12-23 NOTE — PROGRESS NOTES
CHIEF COMPLAINT     Chief Complaint   Patient presents with    Cough     Pt states this has been going on for a month   Pt denies having chest pain or tightness but states she often feels she can't take the deepest breath that she would like.     Nasal Congestion       HPI     Marian Hardy is a 74 y.o. female who presents for xxx today.    PCP is Shantal Beatty MD, patient is new to me. This note was generated with the assistance of ambient listening technology. Verbal consent was obtained by the patient and accompanying visitor(s) for the recording of patient appointment to facilitate this note. I attest to having reviewed and edited the generated note for accuracy, though some syntax or spelling errors may persist. Please contact the author of this note for any clarification.     History of Present Illness    CHIEF COMPLAINT:  Patient presents today with a cough and congestion lasting for a month.    RESPIRATORY:  She reports experiencing cough, congestion, and occasional wheezing for the past month. She typically experiences wheezing once or twice yearly, possibly due to allergies. She uses an albuterol inhaler and notes improvement with exercise. She tried Breo inhaler for 17 days and Robitussin for two days without benefit.    MEDICATIONS:  She currently uses Flonase for allergies and albuterol inhaler. She previously took Singulair but discontinued over a year ago. She is not currently taking oral antihistamines.    CARDIOVASCULAR:  She reports slightly elevated blood pressure.      ROS:  General: -fever, -chills, -fatigue, -weight gain, -weight loss  Eyes: -vision changes, -redness, -discharge  ENT: -ear pain, +nasal congestion, -sore throat  Cardiovascular: -chest pain, -palpitations, -lower extremity edema  Respiratory: +cough, -shortness of breath, +wheezing  Gastrointestinal: -abdominal pain, -nausea, -vomiting, -diarrhea, -constipation, -blood in stool  Genitourinary: -dysuria, -hematuria,  "-frequency  Musculoskeletal: -joint pain, -muscle pain  Skin: -rash, -lesion  Neurological: -headache, -dizziness, -numbness, -tingling  Psychiatric: -anxiety, -depression, -sleep difficulty          Past Medical History:  Past Medical History:   Diagnosis Date    Allergy     Colon polyps     Osteoporosis        Home Medications:  Prior to Admission medications    Medication Sig Start Date End Date Taking? Authorizing Provider   albuterol (PROAIR HFA) 90 mcg/actuation inhaler Inhale 2 puffs into the lungs every 6 (six) hours as needed for Wheezing or Shortness of Breath. Rescue 9/17/24  Yes Shantal Beatty MD   calcium carbonate (OS-DA) 600 mg calcium (1,500 mg) Tab Take 600 mg by mouth once daily.   Yes Provider, Historical   fluticasone propionate (FLONASE) 50 mcg/actuation nasal spray 2 sprays (100 mcg total) by Each Nostril route once daily. 9/17/24  Yes Shantal Beatty MD   valsartan (DIOVAN) 160 MG tablet Take 1 tablet (160 mg total) by mouth once daily. 9/17/24 9/17/25 Yes Shantal Beatty MD   fluticasone furoate-vilanteroL (BREO ELLIPTA) 100-25 mcg/dose diskus inhaler Inhale 1 puff into the lungs once daily. Controller  Patient not taking: Reported on 12/23/2024 11/22/24 11/22/25  Marian Uribe MD   risedronate (ACTONEL) 150 MG Tab Take 1 tablet (150 mg total) by mouth every 30 days.  Patient not taking: Reported on 12/23/2024 4/22/24 4/22/25  Shantal Beatty MD       Review of Systems:  Review of Systems   Constitutional: Negative.    HENT:  Positive for congestion.    Respiratory:  Positive for cough.    Cardiovascular: Negative.        Health Maintainence:   Immunizations:  Health Maintenance         Date Due Completion Date    DEXA Scan 10/15/2027 10/15/2024    Override on 1/14/2020: Done    Override on 8/2/2017: Done    Lipid Panel 09/13/2029 9/13/2024    TETANUS VACCINE 11/02/2034 11/2/2024             PHYSICAL EXAM     BP (!) 144/80 (Patient Position: Sitting)   Pulse 65   Ht 5' 1" " (1.549 m)   Wt 51.3 kg (113 lb 1.5 oz)   SpO2 97%   BMI 21.37 kg/m²     Physical Exam  Vitals reviewed.   Constitutional:       Appearance: She is well-developed.   HENT:      Head: Normocephalic and atraumatic.   Eyes:      Conjunctiva/sclera: Conjunctivae normal.   Cardiovascular:      Rate and Rhythm: Normal rate.   Pulmonary:      Effort: Pulmonary effort is normal. No respiratory distress.   Skin:     General: Skin is warm and dry.      Findings: No rash.   Neurological:      Mental Status: She is alert and oriented to person, place, and time.      Coordination: Coordination normal.   Psychiatric:         Behavior: Behavior normal.           ASSESSMENT/PLAN   Assessment & Plan    IMPRESSION:  - Assessed persistent cough and congestion, likely due to allergies or post-viral inflammation  - Considered history of Singulair use and current Flonase use  - Determined need for additional symptom management with antihistamines and bronchodilators  - Evaluated blood pressure, noting potential white coat hypertension    ASTHMA:  - Started albuterol inhaler (Ventolin) 2-3 times daily as scheduled, regardless of symptom severity.  - Explained that albuterol decreases inflammation in the lungs rather than producing phlegm.  - Clarified that albuterol is a bronchodilator, not a steroid, and should not affect blood pressure or blood sugar.    ALLERGIC RHINITIS:  - Discussed potential benefits of combining oral antihistamines with nasal sprays for symptom relief.  - Started daily oral antihistamine (generic Claritin, Zyrtec, or Allegra) for at least 5-7 days.  - Continued Flonase nasal spray as currently prescribed.  - If no relief after a few days on oral antihistamine, start Azelastine nasal spray in addition to Flonase.    FOLLOW-UP AND GENERAL INSTRUCTIONS:  - Contact the office through the patient portal if not getting results from the prescribed treatment plan by early next week.  - Contact the office through the  "portal for any questions or concerns.          Marian Leal" was seen today for cough and nasal congestion.    Diagnoses and all orders for this visit:    Seasonal allergies          Abdirahman Sweet NP   Department of Internal Medicine - [facility]  3:27 PM  "

## 2024-12-23 NOTE — PATIENT INSTRUCTIONS
Once daily antihistamine (ex. Claratin, xyzol, zyrtec, allegra) and twice daily azelastine nasal spray to decrease sinus congestion.

## 2024-12-27 VITALS — SYSTOLIC BLOOD PRESSURE: 124 MMHG | DIASTOLIC BLOOD PRESSURE: 87 MMHG

## 2024-12-27 DIAGNOSIS — J45.20 MILD INTERMITTENT EXTRINSIC ASTHMA WITHOUT COMPLICATION: ICD-10-CM

## 2024-12-27 NOTE — TELEPHONE ENCOUNTER
Refill Encounter    PCP Visits: Recent Visits  Date Type Provider Dept   09/17/24 Office Visit Shantal Beatty MD [facility]   03/05/24 Office Visit Shantal Beatty MD [facility]   Showing recent visits within past 360 days and meeting all other requirements  Future Appointments  No visits were found meeting these conditions.  Showing future appointments within next 720 days and meeting all other requirements     Last 3 Blood Pressure:   BP Readings from Last 3 Encounters:   12/23/24 (!) 144/80   09/17/24 118/68   08/14/24 (!) 160/90     Preferred Pharmacy:   [facility]/pharmacy #[id number] - [place] - [address]  [place] [address]  Phone: [phone] Fax: [phone]    Requested RX:  Requested Prescriptions     Pending Prescriptions Disp Refills    albuterol (PROAIR HFA) 90 mcg/actuation inhaler 18 g 11     Sig: Inhale 2 puffs into the lungs every 6 (six) hours as needed for Wheezing or Shortness of Breath. Rescue      RX Route: Normal

## 2024-12-27 NOTE — TELEPHONE ENCOUNTER
No care due was identified.  [facility] Embedded Care Due Messages. Reference number: [id number].   12/27/2024 7:19:15 AM CST

## 2024-12-28 ENCOUNTER — NURSE TRIAGE (OUTPATIENT)
Dept: ADMINISTRATIVE | Facility: CLINIC | Age: 74
End: 2024-12-28
Payer: MEDICARE

## 2024-12-28 NOTE — TELEPHONE ENCOUNTER
"[place]    PCP:  Dr. Shantal Beatty    C/O productive cough with opaque to whitish phlegm, "constant" moderate SOB, "near" constant wheezing, and asthmatic symptoms for the last 6 wks.  She reports she was seen on Monday and allergy med was increased and advised to use rescue inhaler which she has done without improvement in symptoms.  Denies fever, stridor, and CP.  Per protocol, care advised is go to ED now.  Pt VU and refused care advised.  Care advice reinforced but she continues to refuse care advice.  Pt states "this was not helpful at all" and hung up on NT.  Message routed high priority to PCP.    Reason for Disposition   [1] MODERATE difficulty breathing (e.g., speaks in phrases, SOB even at rest, pulse 100-120) AND [2] NEW-onset or WORSE than normal    Additional Information   Negative: SEVERE difficulty breathing (e.g., struggling for each breath, speaks in single words)   Negative: [1] Breathing stopped AND [2] hasn't returned   Negative: Choking on something   Negative: Bluish (or gray) lips or face now   Negative: Difficult to awaken or acting confused (e.g., disoriented, slurred speech)   Negative: Passed out (e.g., fainted, lost consciousness, blacked out and was not responding)   Negative: Wheezing started suddenly after medicine, an allergic food or bee sting   Negative: Stridor (harsh sound while breathing in)   Negative: Slow, shallow and weak breathing   Negative: Sounds like a life-threatening emergency to the triager    Protocols used: Breathing Difficulty-A-AH    "

## 2024-12-30 RX ORDER — ALBUTEROL SULFATE 90 UG/1
2 INHALANT RESPIRATORY (INHALATION) EVERY 6 HOURS PRN
Qty: 18 G | Refills: 11 | Status: SHIPPED | OUTPATIENT
Start: 2024-12-30

## 2025-01-22 ENCOUNTER — PATIENT OUTREACH (OUTPATIENT)
Dept: ADMINISTRATIVE | Facility: HOSPITAL | Age: 75
End: 2025-01-22
Payer: MEDICARE

## 2025-01-27 ENCOUNTER — PATIENT MESSAGE (OUTPATIENT)
Dept: OTOLARYNGOLOGY | Facility: CLINIC | Age: 75
End: 2025-01-27
Payer: MEDICARE

## 2025-02-22 DIAGNOSIS — Z00.00 ENCOUNTER FOR MEDICARE ANNUAL WELLNESS EXAM: ICD-10-CM

## 2025-03-10 ENCOUNTER — OFFICE VISIT (OUTPATIENT)
Dept: ENDOCRINOLOGY | Facility: CLINIC | Age: 75
End: 2025-03-10
Payer: MEDICARE

## 2025-03-10 VITALS
HEIGHT: 61 IN | WEIGHT: 112.19 LBS | DIASTOLIC BLOOD PRESSURE: 74 MMHG | BODY MASS INDEX: 21.18 KG/M2 | SYSTOLIC BLOOD PRESSURE: 126 MMHG | HEART RATE: 84 BPM

## 2025-03-10 DIAGNOSIS — E55.9 VITAMIN D DEFICIENCY: ICD-10-CM

## 2025-03-10 DIAGNOSIS — M81.0 AGE-RELATED OSTEOPOROSIS WITHOUT CURRENT PATHOLOGICAL FRACTURE: Primary | ICD-10-CM

## 2025-03-10 DIAGNOSIS — R73.03 PREDIABETES: ICD-10-CM

## 2025-03-10 PROCEDURE — 1160F RVW MEDS BY RX/DR IN RCRD: CPT | Mod: HCNC,CPTII,S$GLB, | Performed by: HOSPITALIST

## 2025-03-10 PROCEDURE — 3288F FALL RISK ASSESSMENT DOCD: CPT | Mod: HCNC,CPTII,S$GLB, | Performed by: HOSPITALIST

## 2025-03-10 PROCEDURE — 99204 OFFICE O/P NEW MOD 45 MIN: CPT | Mod: HCNC,S$GLB,, | Performed by: HOSPITALIST

## 2025-03-10 PROCEDURE — 99999 PR PBB SHADOW E&M-EST. PATIENT-LVL III: CPT | Mod: PBBFAC,HCNC,, | Performed by: HOSPITALIST

## 2025-03-10 PROCEDURE — 3074F SYST BP LT 130 MM HG: CPT | Mod: HCNC,CPTII,S$GLB, | Performed by: HOSPITALIST

## 2025-03-10 PROCEDURE — 3078F DIAST BP <80 MM HG: CPT | Mod: HCNC,CPTII,S$GLB, | Performed by: HOSPITALIST

## 2025-03-10 PROCEDURE — 1101F PT FALLS ASSESS-DOCD LE1/YR: CPT | Mod: HCNC,CPTII,S$GLB, | Performed by: HOSPITALIST

## 2025-03-10 PROCEDURE — 1159F MED LIST DOCD IN RCRD: CPT | Mod: HCNC,CPTII,S$GLB, | Performed by: HOSPITALIST

## 2025-03-10 NOTE — PROGRESS NOTES
"Subjective:      Patient ID: Marian Hardy is a 74 y.o. female presented to [facility] today.  Chief complaint:  Osteoporosis      History of Present illness: Marian Hardy is a 74 y.o. female here for  osteoporosis  No other significant past medical history  Current PCP Shantal Beatty MD    Interval history: Sent by PCP for evaluation of osteoporosis   Has been consistently taking Actonel for the last 2 years      Osteoporosis  - Diagnosis osteopenia over the last 10 years, currently on Actonel, previously was on Fosamax over 10 years.  Most recent bone density confirms still ongoing osteoporosis  - Current medication:  ACTONEL/RISEDRONATE Start date: 11/2022 until 3/10/2025  - Past medication: Fosamax for a few years >10 years ago  - Vit D intake?/Calcium intake? Calcium 600 mg daily, does eat calcium food. Vit D3 2000 ui daily  - History of falls over the last 2 years: no  - History of fractures to wrist, hip or spine: yes, R hand fracture 7/2024  - History of L4 compression fracture, had a fall at that time, no intervention: 1st noted 12/17/2018, MRI lumbar spine 1/2019: Compression deformity is noted at the L4 vertebral body with 25-50% vertebral body height loss  - No back pain    Osteoporosis Risk Factor Assessment:  - Family history of osteoporosis: mom  - Family history of fractures of hip: yes, mom with vertebral fracture in her 90s  - History of loss of height of more than 1 1/2 to 2 inches: no, measured height in clinic: 5'0.5", previously report: 5'1"  - Age of menopause: 52s, Hysterectomy: No  - Tobacco use, including use in the past:  yes, quit at age 23 yo  - Patient lives with:  Family at home   - Walking gait:  normal  - Weight bearing exercise?  Walking , yoga    Medical hx  - Dental work planned? yes, sees dentist regularly, routine cleaning: yes.   - Chronic kidney disease: no  - History of Hyperparathyrodism, no  - History of kidney stones, no  - " History of cancer or malignancy, history of malignancy, or prior radiation treatment, yes, did have facial radiation for skin cancer in her 30s    Recent DXA: Reviewed   10/15/2024  LUMBAR SPINE (L1-L3): BMD:  0.967 g/cm2. T-Score:  -1.8 SD Z-Score:  0.5 SD  Chronic L4 vertebral body height loss.     LEFT HIP (total hip) BMD:  0.827 g/cm2. T-Score:  -1.4 SD Z-Score:  0.7 SD  LEFT HIP (femoral neck) BMD:  0.704 g/cm2. T-Score:  -2.4 SD Z-Score:  -0.1 SD    RIGHT HIP (total hip) BMD:  0.764 g/cm2. T-Score:  -1.9 SD Z-Score:  0.2 SD  RIGHT HIP (femoral neck) BMD:  0.653 g/cm2. T-Score:  -2.8 SD Z-Score:  -0.5 SD     COMPARISON TO PRIOR BONE MINERAL DENSITY:    Prior LUMBAR SPINE bone mineral density was  0.917 g/cm2.  There has been a 5.4% increase which is statistically significant.  Prior MEAN FEMORAL NECK bone mineral density was 0.663 g/cm2.  There has been a 2.3% increase which is not statistically significant.     Impression:  1. Osteoporosis.  Ten year probability of major osteoporotic fracture is 17.4%, and hip fracture is 6.6%.  2. Chronic L4 vertebral body height loss, likely sequela of compression fracture.  FOLLOW-UP RECOMMENDATIONS: Screening DEXA BMD testing in two years is recommended, as clinically indicated.  However, when monitoring for rapid bone loss, repeat scanning as frequently as every 6 to 12 months may be desirable or necessary.  (Please note that sequential exams to assess change or best on the same machine each time in order to preserve comparison accuracy.)     10/14/2022  The bone mineral density measured from L1 through L4 is 1.019g/cm2.  This corresponds to a T score of -1.4 and a Z score of 0.9.  The bone mineral density within the left femoral neck measures 0.670 g/cm2.  This corresponds to a T score of -2.6 and a Z score of -0.4.  The bone mineral density within the right femoral neck measures 0.656 g/cm2.  This corresponds to a T score of -2.8 and a Z score of -0.5.     FRAX  "RESULTS:  10-year Probability of Fracture:  Major Osteoporotic Fracture 16.4%.  Hip Fracture 5.9%.     Impression:  Osteoporosis both hips.     Lab work reviewed  Lab Results   Component Value Date    CALCIUM 9.8 09/25/2024    CALCIUM 9.6 09/01/2023    CALCIUM 9.3 09/12/2022     Lab Results   Component Value Date    ALKPHOS 36 (L) 09/01/2023    ALKPHOS 53 09/12/2022    ALKPHOS 50 06/30/2021    EGFRNORACEVR 79 09/25/2024    ALBUMIN 4.3 09/25/2024    TSH 3.36 09/13/2024     No results found for: "PTHRELATEDPR", "PIHBFUSC472C", "CTELOPEPTIDE", "PROCOLLAGEN"    The patient's medications, allergies, past medical, surgical, social and family histories were reviewed and updated as appropriate.  Review of Systems: pertinent positives as per the above HPI, and otherwise negative.    Objective:   /74   Pulse 84   Ht 5' 0.5" (1.537 m)   Wt 50.9 kg (112 lb 3.2 oz)   BMI 21.55 kg/m²   Body mass index is 21.55 kg/m².  Vital signs reviewed    Physical Exam  Vitals and nursing note reviewed.   Constitutional:       Appearance: Normal appearance. She is well-developed. She is not ill-appearing.   Neck:      Thyroid: No thyromegaly.   Pulmonary:      Effort: Pulmonary effort is normal. No respiratory distress.   Musculoskeletal:         General: Normal range of motion.      Cervical back: Normal range of motion.   Neurological:      General: No focal deficit present.      Mental Status: She is alert. Mental status is at baseline.   Psychiatric:         Mood and Affect: Mood normal.         Behavior: Behavior normal.       Labs reviewed:  See results in subjective  Lab Results   Component Value Date    HGBA1C 5.7 (H) 09/13/2024     Lab Results   Component Value Date    CHOL 199 09/13/2024    HDL 89 09/13/2024    LDLCALC 94 09/13/2024    TRIG 73 09/13/2024    CHOLHDL 2.2 09/13/2024     Lab Results   Component Value Date     (L) 09/25/2024    K 4.6 09/25/2024    CL 99 09/25/2024    CO2 26 09/25/2024    GLU 94 09/25/2024 "    BUN 13 09/25/2024    CREATININE 0.78 09/25/2024    CALCIUM 9.8 09/25/2024    PROT 6.9 09/25/2024    ALBUMIN 4.3 09/25/2024    BILITOT 0.6 09/25/2024    ALKPHOS 36 (L) 09/01/2023    AST 15 09/25/2024    ALT 11 09/25/2024    ANIONGAP 10 09/01/2023    EGFRNORACEVR 79 09/25/2024    TSH 3.36 09/13/2024     Assessment     1. Age-related osteoporosis without current pathological fracture  Ambulatory referral/consult to Endocrinology    Renal Function Panel      2. Vitamin D deficiency  Vitamin D      3. Prediabetes           Plan     Age-related osteoporosis without current pathological fracture  - Patient is here for evaluation and management of osteoporosis, history as above  - Recent DXA: Independently reviewed in clinic, confirms osteoporosis DXA: 10/15/2024:  Right hip: T-score -2.8,  5.4% increase lumbar spine, 2.3% increase femoral neck which is not statistically significant.  - Previous medication: ACTONEL/RISEDRONATE Start date: 11/2022 until 3/10/2025, previous Fosamax for a few years >10 years ago  - History of fracture:  R hand fracture 7/2024, History of L4 compression fracture in 2018- No back pain currently    Plan  - Discuss treatment options:  Given long-term history of oral bisphosphonate, currently on Actonel [id number]  - SWITCH PATIENT TO SC Prolia every 6 months, at [facility] infusion >> Patient was okay with this plan  - Duration of therapy of at minimum 2 years and side effect profile discussed (including acute-phase reaction), given High risk of fracture, outweighs the risk of side effects  - Recommend the patient take sufficient calcium and vitamin D3 OTC  - Check routine lab work: check Renal Panel, vitamin-D every 6 months  - Fall precautions/Exercise regimen: advised, exercises recommended  - Routine dental health screening advised, recommend dental cleaning every 6 months.   - Next DXA: 2 years from most current, 10/2026, after 4 injections of Prolia  - Routine follow-up with me every 6  months    Vitamin D deficiency  - Vitamin-D goal >30  - continue current OTC vitamin-D 2000 IU daily  - check level 2025    Prediabetes  - A1c 5.7  - continue monitoring and management with PCP      Advised patient to follow up with PCP for routine health maintenance care.   RTC in 6 months    Warren Rodriguez M.D.  Endocrinology  [facility]  3/10/2025      Disclaimer: This note has been generated in part with the use of voice-recognition software. There may be typographical errors that have been missed during proof-reading.

## 2025-03-10 NOTE — ASSESSMENT & PLAN NOTE
- Patient is here for evaluation and management of osteoporosis, history as above  - Recent DXA: Independently reviewed in clinic, confirms osteoporosis DXA: 10/15/2024:  Right hip: T-score -2.8,  5.4% increase lumbar spine, 2.3% increase femoral neck which is not statistically significant.  - Previous medication: ACTONEL/RISEDRONATE Start date: 11/2022 until 3/10/2025, previous Fosamax for a few years >10 years ago  - History of fracture:  R hand fracture 7/2024, History of L4 compression fracture in 2018- No back pain currently    Plan  - Discuss treatment options:  Given long-term history of oral bisphosphonate, currently on Actonel [id number]  - SWITCH PATIENT TO SC Prolia every 6 months, at [facility] infusion >> Patient was okay with this plan  - Duration of therapy of at minimum 2 years and side effect profile discussed (including acute-phase reaction), given High risk of fracture, outweighs the risk of side effects  - Recommend the patient take sufficient calcium and vitamin D3 OTC  - Check routine lab work: check Renal Panel, vitamin-D every 6 months  - Fall precautions/Exercise regimen: advised, exercises recommended  - Routine dental health screening advised, recommend dental cleaning every 6 months.   - Next DXA: 2 years from most current, 10/2026, after 4 injections of Prolia  - Routine follow-up with me every 6 months

## 2025-03-14 ENCOUNTER — LAB VISIT (OUTPATIENT)
Dept: LAB | Facility: HOSPITAL | Age: 75
End: 2025-03-14
Attending: STUDENT IN AN ORGANIZED HEALTH CARE EDUCATION/TRAINING PROGRAM
Payer: MEDICARE

## 2025-03-14 DIAGNOSIS — M81.0 AGE-RELATED OSTEOPOROSIS WITHOUT CURRENT PATHOLOGICAL FRACTURE: ICD-10-CM

## 2025-03-14 DIAGNOSIS — E55.9 VITAMIN D DEFICIENCY: ICD-10-CM

## 2025-03-14 LAB
25(OH)D3+25(OH)D2 SERPL-MCNC: 60 NG/ML (ref 30–96)
ALBUMIN SERPL BCP-MCNC: 4 G/DL (ref 3.5–5.2)
ANION GAP SERPL CALC-SCNC: 5 MMOL/L (ref 8–16)
BUN SERPL-MCNC: 12 MG/DL (ref 8–23)
CALCIUM SERPL-MCNC: 9.9 MG/DL (ref 8.7–10.5)
CHLORIDE SERPL-SCNC: 100 MMOL/L (ref 95–110)
CO2 SERPL-SCNC: 28 MMOL/L (ref 23–29)
CREAT SERPL-MCNC: 0.8 MG/DL (ref 0.5–1.4)
EST. GFR  (NO RACE VARIABLE): >60 ML/MIN/1.73 M^2
GLUCOSE SERPL-MCNC: 94 MG/DL (ref 70–110)
PHOSPHATE SERPL-MCNC: 3.5 MG/DL (ref 2.7–4.5)
POTASSIUM SERPL-SCNC: 4.3 MMOL/L (ref 3.5–5.1)
SODIUM SERPL-SCNC: 133 MMOL/L (ref 136–145)

## 2025-03-14 PROCEDURE — 82306 VITAMIN D 25 HYDROXY: CPT | Mod: HCNC | Performed by: HOSPITALIST

## 2025-03-14 PROCEDURE — 80069 RENAL FUNCTION PANEL: CPT | Mod: HCNC | Performed by: HOSPITALIST

## 2025-03-14 PROCEDURE — 36415 COLL VENOUS BLD VENIPUNCTURE: CPT | Performed by: HOSPITALIST

## 2025-03-19 ENCOUNTER — PATIENT MESSAGE (OUTPATIENT)
Dept: ENDOCRINOLOGY | Facility: CLINIC | Age: 75
End: 2025-03-19
Payer: MEDICARE

## 2025-03-21 ENCOUNTER — TELEPHONE (OUTPATIENT)
Dept: INFUSION THERAPY | Facility: HOSPITAL | Age: 75
End: 2025-03-21
Payer: MEDICARE

## 2025-03-26 ENCOUNTER — INFUSION (OUTPATIENT)
Dept: INFUSION THERAPY | Facility: HOSPITAL | Age: 75
End: 2025-03-26
Payer: MEDICARE

## 2025-03-26 DIAGNOSIS — M81.0 AGE-RELATED OSTEOPOROSIS WITHOUT CURRENT PATHOLOGICAL FRACTURE: Primary | ICD-10-CM

## 2025-03-26 PROCEDURE — 96372 THER/PROPH/DIAG INJ SC/IM: CPT | Mod: HCNC

## 2025-03-26 PROCEDURE — 63600175 PHARM REV CODE 636 W HCPCS: Mod: JZ,TB,HCNC | Performed by: HOSPITALIST

## 2025-03-26 RX ADMIN — DENOSUMAB 60 MG: 60 INJECTION SUBCUTANEOUS at 03:03

## 2025-03-26 NOTE — NURSING
Pt given SubQ Prolia to left arm.  injection. Tolerated well. Care explained, all questions answered. Patient verifies taking calcium and vit D supplements. Denies jaw pain and any recent invasive dental work. D/C from clinic.

## 2025-04-21 ENCOUNTER — PATIENT MESSAGE (OUTPATIENT)
Dept: INTERNAL MEDICINE | Facility: CLINIC | Age: 75
End: 2025-04-21
Payer: MEDICARE

## 2025-05-22 ENCOUNTER — TELEPHONE (OUTPATIENT)
Dept: INTERNAL MEDICINE | Facility: CLINIC | Age: 75
End: 2025-05-22
Payer: MEDICARE

## 2025-05-22 ENCOUNTER — NURSE TRIAGE (OUTPATIENT)
Dept: ADMINISTRATIVE | Facility: CLINIC | Age: 75
End: 2025-05-22
Payer: MEDICARE

## 2025-05-22 NOTE — TELEPHONE ENCOUNTER
Pt called triage line. Triage nurse called pt back. Pt did not verify name and , pt report speaking with PCP office and no longer needing triage nurse.     Reason for Disposition   Caller has already spoken with the PCP (or office), and has no further questions    Protocols used: No Contact or Duplicate Contact Call-A-OH

## 2025-05-22 NOTE — TELEPHONE ENCOUNTER
----- Message from Augustinekayain sent at 5/22/2025  9:31 AM CDT -----  Type : Patient Call  Who Called : Patient  Does the patient know what this is regarding?: Pt is requesting a call back in regards to a lingering cough and wheezing . Pt stated she sent over messages in the portal to the provider but did not get a response , in which it has been 4 days since the pt sent the message. Please Advise   Would the patient rather a call back or a response via [facility]? Call   Best Call Back Number: [phone]   Additional Information:

## 2025-05-23 ENCOUNTER — OFFICE VISIT (OUTPATIENT)
Dept: INTERNAL MEDICINE | Facility: CLINIC | Age: 75
End: 2025-05-23
Payer: MEDICARE

## 2025-05-23 VITALS
SYSTOLIC BLOOD PRESSURE: 120 MMHG | DIASTOLIC BLOOD PRESSURE: 88 MMHG | OXYGEN SATURATION: 99 % | HEIGHT: 60 IN | TEMPERATURE: 98 F | BODY MASS INDEX: 22.11 KG/M2 | WEIGHT: 112.63 LBS | HEART RATE: 67 BPM

## 2025-05-23 DIAGNOSIS — I49.1 PREMATURE ATRIAL COMPLEXES: ICD-10-CM

## 2025-05-23 DIAGNOSIS — I10 PRIMARY HYPERTENSION: ICD-10-CM

## 2025-05-23 DIAGNOSIS — J45.30 MILD PERSISTENT EXTRINSIC ASTHMA WITHOUT COMPLICATION: ICD-10-CM

## 2025-05-23 DIAGNOSIS — R05.1 ACUTE COUGH: Primary | ICD-10-CM

## 2025-05-23 LAB
OHS QRS DURATION: 74 MS
OHS QTC CALCULATION: 433 MS

## 2025-05-23 PROCEDURE — 99999 PR PBB SHADOW E&M-EST. PATIENT-LVL III: CPT | Mod: PBBFAC,HCNC,, | Performed by: COUNSELOR

## 2025-05-23 RX ORDER — ALBUTEROL SULFATE AND BUDESONIDE 90; 80 UG/1; UG/1
AEROSOL, METERED RESPIRATORY (INHALATION)
COMMUNITY
Start: 2025-04-29

## 2025-05-23 RX ORDER — PREDNISONE 20 MG/1
20 TABLET ORAL DAILY
Qty: 5 TABLET | Refills: 0 | Status: SHIPPED | OUTPATIENT
Start: 2025-05-23 | End: 2025-05-28

## 2025-05-23 NOTE — PROGRESS NOTES
"Subjective:       Patient ID: Marian Hardy is a 74 y.o. female with a history of allergic rhinitis, asthma, HTN, thrombocytopenia, vitamin-D deficiency, osteoporosis, prediabetes.    Chief Complaint: Acute cough [R05.1]    Patient is new to me, PCP is Dr. Shantal Beatty. Here today for the following:    History of Present Illness    CHIEF COMPLAINT:  Patient presents today for evaluation of persistent cough and breathing difficulties.    HISTORY OF PRESENT ILLNESS:  She reports illness onset in mid-April with nasal congestion that progressed to coughing and worsening SOB. She denied any ST/fever. She went to the  and was given Abx (unsure which, probably amoxicillin) and steroids. Her symptoms improved initially to about "95%" of baseline. She used her albuterol inhaler during this time and was able to stop after treatment.     Symptoms returned again a few couple weeks later and returned to  at the end of April. CXR was normal and she was prescribed Airsupra. She is unsure if steroids or Abx were prescribed at that time. The Airsupra has helped with her symptoms but she has not fully improved.     Today, she reports some chest "fullness" and wheezing with cough, which prompted her return.   She feels she is now at "80%" of her baseline.   She denies any chest pains, palpitations, dizziness, arm numbness, new onset blurry vision, peripheral edema, nighttime coughing.    She reports that she has similar exacerbations 2-3 times per year.  She does not have PFT's documented.   Previously she was taking Breo Ellipta, but reports she has not used this in about 1 year.   Her BP has been well controlled on valsartan.  She plans on doing a cataract surgery next Wednesday.    MEDICAL HISTORY:  She has a history of mild osteoporosis. She was evaluated by an allergist approximately 2 years ago.    SOCIAL HISTORY:  She is a former smoker who quit 50 years ago.    ROS:  General: -fever, -chills, -fatigue, -weight " gain, -weight loss  Eyes: -vision changes, -redness, -discharge, +blurry vision  ENT: -ear pain, -nasal congestion, -sore throat  Cardiovascular: -chest pain, -palpitations, -lower extremity edema  Respiratory: -cough, -shortness of breath, +productive cough, +wheezing, +exertional dyspnea  Gastrointestinal: -abdominal pain, -nausea, -vomiting, -diarrhea, -constipation, -blood in stool  Genitourinary: -dysuria, -hematuria, -frequency  Musculoskeletal: -joint pain, -muscle pain  Skin: -rash, -lesion  Neurological: -headache, -dizziness, -numbness, -tingling  Psychiatric: -anxiety, -depression, -sleep difficulty          Current Outpatient Medications   Medication Instructions    AIRSUPRA 90-80 mcg/actuation Inhale into the lungs.    albuterol (PROAIR HFA) 90 mcg/actuation inhaler 2 puffs, Inhalation, Every 6 hours PRN, Rescue    calcium carbonate (OS-DA) 600 mg, Daily    fluticasone propionate (FLONASE) 100 mcg, Each Nostril, Daily    predniSONE (DELTASONE) 20 mg, Oral, Daily    valsartan (DIOVAN) 160 mg, Oral, Daily     Objective:      Vitals:    05/23/25 0817   BP: 120/88   Pulse: 67   Temp: 97.6 °F (36.4 °C)   SpO2: 99%   Weight: 51.1 kg (112 lb 10.5 oz)   Height: 5' (1.524 m)   PainSc: 0-No pain     Body mass index is 22 kg/m².    Physical Exam  Vitals reviewed.   Constitutional:       General: She is not in acute distress.     Appearance: Normal appearance. She is normal weight. She is not ill-appearing, toxic-appearing or diaphoretic.   HENT:      Head: Normocephalic and atraumatic.      Right Ear: Tympanic membrane, ear canal and external ear normal.      Left Ear: Tympanic membrane, ear canal and external ear normal.      Ears:      Comments: Removed significant cerumen from both ear canals.     Nose: Nose normal.      Comments: Small area of dryness in left nostril.     Mouth/Throat:      Mouth: Mucous membranes are moist.      Pharynx: Oropharynx is clear. No oropharyngeal exudate or posterior  oropharyngeal erythema.   Eyes:      General:         Right eye: No discharge.         Left eye: No discharge.      Extraocular Movements: Extraocular movements intact.      Conjunctiva/sclera: Conjunctivae normal.   Cardiovascular:      Rate and Rhythm: Normal rate. Rhythm irregular.      Pulses: Normal pulses.      Heart sounds: Normal heart sounds. No murmur heard.     No friction rub. No gallop.      Comments: Regularly irregular rhythm. Early beat every 2-3 heart beats.   Pulmonary:      Effort: Pulmonary effort is normal. No respiratory distress.      Breath sounds: No stridor. Wheezing present. No rhonchi or rales.      Comments: Slight inspiratory wheeze in all lung fields.  Musculoskeletal:      Cervical back: Neck supple. No tenderness.      Right lower leg: No edema.      Left lower leg: No edema.   Lymphadenopathy:      Cervical: No cervical adenopathy.   Skin:     General: Skin is warm and dry.      Capillary Refill: Capillary refill takes less than 2 seconds.      Findings: No lesion or rash.   Neurological:      General: No focal deficit present.      Mental Status: She is alert and oriented to person, place, and time. Mental status is at baseline.   Psychiatric:         Mood and Affect: Mood normal.         Behavior: Behavior normal.         Thought Content: Thought content normal.         Judgment: Judgment normal.         Assessment:       1. Acute cough    2. Mild persistent extrinsic asthma without complication    3. Irregular heartbeat        IMPRESSION:  - Assessed symptoms as likely asthma exacerbation, with recurrent episodes over past 2 months.  - Noted Air Supra inhaler has provided symptom relief and continued use as needed for symptom management, increasing use as needed.  - Considered history of osteoporosis when evaluating steroid treatment options.  - Reviewed EKG results performed in office, noting premature atrial complexes but determining prednisone treatment still appropriate.  -  Plan to consult with Dr. Beatty about case and consider ordering pulmonary function tests to evaluate for underlying lung disease.    Plan:     PLAN SUMMARY:  - Prescribed oral prednisone 20 mg daily in the morning with food for 5 days  - Continue Air Supra inhaler, 1 puff morning and evening  - Advised to avoid ibuprofen/Advil while on steroids  - Recommend OTC cough suppressants if needed  - Contact ophthalmologist regarding contraindications before cataract surgery  - Monitor blood pressure and watch for palpitations or dizziness during steroid treatment    Acute cough  Mild persistent extrinsic asthma without complication  - Monitored patient's asthma exacerbations occurring 2-3 times per year, with current symptoms of chest fullness and mild wheezing.  - Patient is at 80% of baseline with no suspected bacterial infection.  - COVID and flu tests negative.  - Patient uses Air Supra inhaler, initially 2 puffs 3-4 times daily, now reduced to 1 puff morning and evening.  - Prescribed oral prednisone 20 mg daily in the morning with food for 5 days for current exacerbation.  - Advised to avoid ibuprofen/Advil while on steroids due to GI side effects and to contact ophthalmologist regarding any contraindications before upcoming cataract surgery.  - Recommend OTC cough suppressants if needed.  - Consider pulmonary function tests after symptom improvement to determine underlying cause of exacerbations.  - Discussed potential consideration of longer-acting inhaler with steroids and beta agonists based on symptom severity.  - Explained that asthma management is based on symptom severity and may require ongoing medication adjustments.  - Educated patient about signs of worsening symptoms or potential treatment complications.  -     POCT COVID-19 Rapid Screening  -     POCT Influenza A/B Molecular  -     predniSONE (DELTASONE) 20 MG tablet; Take 1 tablet (20 mg total) by mouth once daily. for 5 days  Dispense: 5 tablet;  Refill: 0    Premature atrial complexes  - Monitored occasional atrial complexes, which are typically benign  - EKG confirms PAC's.   - Patient instructed to monitor for palpitations or dizziness while on oral steroids.  -     IN OFFICE EKG 12-LEAD (to Muse)    Primary hypertension  - Blood pressure is very well controlled with Valsartan.  - Discussed potential effects of steroids on blood pressure and advised patient to monitor for palpitations or dizziness during steroid treatment.    ## OSTEOPOROSIS:  - Noted history of mild osteoporosis.  - Discussed potential impact of steroid treatment on bone health.    ## HISTORY OF SMOKING:  - Documented smoking history from 50 years ago, which is not a significant factor currently.         This note was generated with the assistance of ambient listening technology. Verbal consent was obtained by the patient and accompanying visitor(s) for the recording of patient appointment to facilitate this note. I attest to having reviewed and edited the generated note for accuracy, though some syntax or spelling errors may persist. Please contact the author of this note for any clarification.    Kelvin Pastor PA-C    5/23/2025

## 2025-05-28 ENCOUNTER — TELEPHONE (OUTPATIENT)
Dept: INTERNAL MEDICINE | Facility: CLINIC | Age: 75
End: 2025-05-28
Payer: MEDICARE

## 2025-05-28 NOTE — TELEPHONE ENCOUNTER
----- Message from BaronNadianeboni sent at 5/28/2025 10:39 AM CDT -----  Type: Patient CallWho Called: Naomi[facility] patient know what this is regarding? Informing the provider that the pt's surgery had to be postponed due her blood pressure being high. Please advise Does the patient rather a call back or a response via [facility]? callBest Call Back Number: [phone] Additional Information: